# Patient Record
Sex: FEMALE | Race: WHITE | NOT HISPANIC OR LATINO | Employment: OTHER | URBAN - METROPOLITAN AREA
[De-identification: names, ages, dates, MRNs, and addresses within clinical notes are randomized per-mention and may not be internally consistent; named-entity substitution may affect disease eponyms.]

---

## 2019-06-26 ENCOUNTER — OFFICE VISIT CONVERTED (OUTPATIENT)
Dept: OTHER | Facility: HOSPITAL | Age: 64
End: 2019-06-26
Attending: NURSE PRACTITIONER

## 2020-02-14 ENCOUNTER — OFFICE VISIT CONVERTED (OUTPATIENT)
Dept: PULMONOLOGY | Facility: CLINIC | Age: 65
End: 2020-02-14
Attending: INTERNAL MEDICINE

## 2020-03-03 ENCOUNTER — HOSPITAL ENCOUNTER (OUTPATIENT)
Dept: CT IMAGING | Facility: HOSPITAL | Age: 65
Discharge: HOME | End: 2020-03-03
Attending: INTERNAL MEDICINE

## 2020-05-06 ENCOUNTER — OFFICE VISIT CONVERTED (OUTPATIENT)
Dept: PULMONOLOGY | Facility: CLINIC | Age: 65
End: 2020-05-06
Attending: INTERNAL MEDICINE

## 2020-06-11 ENCOUNTER — HOSPITAL ENCOUNTER (OUTPATIENT)
Dept: GASTROENTEROLOGY | Facility: HOSPITAL | Age: 65
Setting detail: HOSPITAL OUTPATIENT SURGERY
Discharge: HOME | End: 2020-06-11
Attending: INTERNAL MEDICINE

## 2020-06-12 LAB — SARS-COV-2 RNA SPEC QL NAA+PROBE: NOT DETECTED

## 2020-06-15 ENCOUNTER — HOSPITAL ENCOUNTER (OUTPATIENT)
Dept: GASTROENTEROLOGY | Facility: HOSPITAL | Age: 65
Setting detail: HOSPITAL OUTPATIENT SURGERY
Discharge: HOME | End: 2020-06-15
Attending: INTERNAL MEDICINE

## 2020-06-15 LAB
EPI CELLS NFR FLD: 2 %
LYMPHOCYTES NFR FLD MANUAL: 60 %
MACROPHAGE FLUID: 18 /100{WBCS}
NEUTROPHILS NFR FLD MANUAL: 20 %
VISUAL PRESENCE OF BLOOD: NORMAL

## 2020-06-18 LAB
BACTERIA SPEC AEROBE CULT: ABNORMAL
BACTERIA SPEC AEROBE CULT: ABNORMAL
CIPROFLOXACIN SUSC ISLT: <=0.5
CLINDAMYCIN SUSC ISLT: 0.25
CONV BRONCHIAL WASH CULTURE: ABNORMAL
DOXYCYCLINE SUSC ISLT: <=0.5
ERYTHROMYCIN SUSC ISLT: >=8
GENTAMICIN SUSC ISLT: <=0.5
LEVOFLOXACIN SUSC ISLT: 0.25
OXACILLIN SUSC ISLT: >=4
RIFAMPIN SUSC ISLT: <=0.5
TETRACYCLINE SUSC ISLT: <=1
TIGECYCLINE SUSC ISLT: <=0.12
TMP SMX SUSC ISLT: <=10
VANCOMYCIN SUSC ISLT: 1

## 2020-06-19 LAB
CONV ADENOVIRUS  (BAL OR WASH): NEGATIVE
FLUAV RNA SPEC QL NAA+PROBE: NEGATIVE
FLUBV RNA ISLT QL NAA+PROBE: NEGATIVE
HMPV RNA SPEC QL NAA+PROBE: NEGATIVE
HPIV1 RNA ISLT QL NAA+PROBE: NEGATIVE
HPIV2 SPEC QL CULT: NEGATIVE
HPIV3 SPEC QL CULT: NEGATIVE
RHINOVIRUS RNA SPEC QL NAA+PROBE: NEGATIVE
RSV A: NEGATIVE
RSV B RNA SPEC QL NAA+PROBE: NEGATIVE

## 2020-06-25 LAB — CONV LEGIONELLA CULTURE: NORMAL

## 2020-06-30 ENCOUNTER — OFFICE VISIT CONVERTED (OUTPATIENT)
Dept: PULMONOLOGY | Facility: CLINIC | Age: 65
End: 2020-06-30
Attending: INTERNAL MEDICINE

## 2020-09-30 ENCOUNTER — OFFICE VISIT CONVERTED (OUTPATIENT)
Dept: PULMONOLOGY | Facility: CLINIC | Age: 65
End: 2020-09-30
Attending: INTERNAL MEDICINE

## 2020-10-05 ENCOUNTER — HOSPITAL ENCOUNTER (OUTPATIENT)
Dept: CT IMAGING | Facility: HOSPITAL | Age: 65
Discharge: HOME | End: 2020-10-05
Attending: INTERNAL MEDICINE

## 2021-03-01 ENCOUNTER — HOSPITAL ENCOUNTER (OUTPATIENT)
Dept: CARDIOLOGY | Facility: HOSPITAL | Age: 66
Discharge: HOME | End: 2021-03-01
Attending: INTERNAL MEDICINE

## 2021-03-23 ENCOUNTER — OFFICE VISIT CONVERTED (OUTPATIENT)
Dept: PULMONOLOGY | Facility: CLINIC | Age: 66
End: 2021-03-23
Attending: NURSE PRACTITIONER

## 2021-05-15 VITALS
SYSTOLIC BLOOD PRESSURE: 110 MMHG | OXYGEN SATURATION: 97 % | WEIGHT: 193 LBS | TEMPERATURE: 98 F | RESPIRATION RATE: 18 BRPM | HEIGHT: 66 IN | DIASTOLIC BLOOD PRESSURE: 76 MMHG | HEART RATE: 80 BPM | BODY MASS INDEX: 31.02 KG/M2

## 2021-05-23 ENCOUNTER — TRANSCRIBE ORDERS (OUTPATIENT)
Dept: ADMINISTRATIVE | Facility: HOSPITAL | Age: 66
End: 2021-05-23

## 2021-05-23 DIAGNOSIS — J84.9 INTERSTITIAL PULMONARY DISEASE (HCC): Primary | ICD-10-CM

## 2021-05-24 ENCOUNTER — OFFICE VISIT CONVERTED (OUTPATIENT)
Dept: PULMONOLOGY | Facility: CLINIC | Age: 66
End: 2021-05-24
Attending: INTERNAL MEDICINE

## 2021-05-28 VITALS
OXYGEN SATURATION: 98 % | WEIGHT: 190 LBS | TEMPERATURE: 97.6 F | SYSTOLIC BLOOD PRESSURE: 139 MMHG | HEIGHT: 66 IN | HEART RATE: 62 BPM | RESPIRATION RATE: 10 BRPM | BODY MASS INDEX: 31.5 KG/M2 | OXYGEN SATURATION: 96 % | WEIGHT: 196 LBS | DIASTOLIC BLOOD PRESSURE: 76 MMHG | HEART RATE: 80 BPM | TEMPERATURE: 98 F | DIASTOLIC BLOOD PRESSURE: 72 MMHG | HEIGHT: 66 IN | BODY MASS INDEX: 30.53 KG/M2 | RESPIRATION RATE: 15 BRPM | SYSTOLIC BLOOD PRESSURE: 133 MMHG

## 2021-05-28 VITALS
HEART RATE: 74 BPM | DIASTOLIC BLOOD PRESSURE: 68 MMHG | BODY MASS INDEX: 32.14 KG/M2 | RESPIRATION RATE: 16 BRPM | OXYGEN SATURATION: 99 % | TEMPERATURE: 97.5 F | HEIGHT: 66 IN | SYSTOLIC BLOOD PRESSURE: 129 MMHG | WEIGHT: 200 LBS

## 2021-05-28 VITALS
RESPIRATION RATE: 14 BRPM | WEIGHT: 192 LBS | OXYGEN SATURATION: 98 % | RESPIRATION RATE: 18 BRPM | HEIGHT: 66 IN | DIASTOLIC BLOOD PRESSURE: 95 MMHG | WEIGHT: 191 LBS | BODY MASS INDEX: 30.86 KG/M2 | HEART RATE: 65 BPM | TEMPERATURE: 96.8 F | BODY MASS INDEX: 30.7 KG/M2 | DIASTOLIC BLOOD PRESSURE: 73 MMHG | HEART RATE: 76 BPM | TEMPERATURE: 98 F | HEIGHT: 66 IN | SYSTOLIC BLOOD PRESSURE: 140 MMHG | SYSTOLIC BLOOD PRESSURE: 121 MMHG | OXYGEN SATURATION: 97 %

## 2021-05-28 NOTE — PROGRESS NOTES
Patient: WES DUBON     Acct: [id number]     Report: #[id number]  UNIT #: [id number]     : 1955    Encounter Date:2020  PRIMARY CARE: HARDEEP POE DO  ***Signed***  --------------------------------------------------------------------------------------------------------------------  Chief Complaint      Encounter Date      2020            Primary Care Provider      HARDEEP POE DO            Referring Provider      VIRGIL GENAO [facility]            Patient Complaint      Patient is complaining of      abnormal chest xray            VITALS      Height 5 ft 6 in / 167.64 cm      Weight 196 lbs 0 oz / 88.52902 kg      BSA 1.98 m2      BMI 31.6 kg/m2      Temperature 98.0 F / 36.67 C - Oral      Pulse 62      Respirations 10      Blood Pressure 133/76 Sitting, Right Arm      Pulse Oximetry 98%, roomair      Initial Exhaled Nitrous Oxide      Date:  2020      Exhaled Nitrous Oxide Results:  18            HPI      The patient is a very pleasant 64 year old obese  female never smoker     here for abnormal CT scan of the chest. She reports a history of acid reflux,     fairly well controlled with PPI. She also reports a chronic cough for years. It     is mostly dry and at night with no sputum production or hemoptysis. She has     occasional wheezing with her cough. She notes that when she cuts her dose of PPI    down her acid reflux is worse and her cough gets worse. She denies any dyspnea,     fever or chills, headaches and hemoptysis. She went to the ER with chest pain in    November at her nearby hospital and had a CT scan of the chest with contrast     done showing bibasilar peripheral opacities and ground glass opacities     concerning for possible interstitial lung disease. There was also septal     thickening and mild bibasilar fibrosis noted. She endorses some dry eyes and dry    skin along with pain in hands, knees and wrists. Of note, she notes both her     pinky  fingers are slightly deviated out. She denies any red or swollen joints.     She denies any history of lupus, rheumatoid arthritis or Sjogren's disease. She     denies any ulcers of hands, feet, unusual rashes, lumps or bumps.  She denies     any blurry vision. She lives in [place] and had exposure to mold.  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 Of note, she states she     has inhaled the mold a few times and had worsening respiratory symptoms. She is     not on any inhalers or had pulmonary function test. She has never been told she     had interstitial lung disease before. She denies any leg swelling, orthopnea or     paroxysmal nocturnal dyspnea, chest pain or hemoptysis. She is able to perform     her activities of daily living without difficulty and denies any swollen glands     in her lymph nodes, head or neck.              I personally reviewed Review of Systems, family, social, surgical and medical     history and agree with their findings.            ROS      Constitutional:  Denies: Fatigue, Fever, Weight gain, Weight loss, Chills,     Insomnia, Other      Respiratory/Breathing:  Complains of: Wheezing, Cough; Denies: Shortness of air,    Hemoptysis, Pleuritic pain, Other      Endocrine:  Denies: Polydipsia, Polyuria, Heat/cold intolerance, Abnorml     menstrual pattern, Diabetes, Other      Eyes:  Denies: Blurred vision, Vision Changes, Other      Ears, nose, mouth, throat:  Denies: Mouth lesions, Thrush, Throat pain,     Hoarseness, Allergies/Hay Fever, Post Nasal Drip, Headaches, Recent Head Injury,    Nose Bleeding, Neck Stiffness, Thyroid Mass, Hearing Loss, Ear Fullness, Dry     Mouth, Nasal or Sinus Pain, Dry Lips, Nasal discharge, Nasal congestion, Other      Cardiovascular:  Denies: Palpitations, Syncope, Claudication, Chest Pain, Wake     up Gasping for air, Leg Swelling, Irregular Heart  Rate, Cyanosis, Dyspnea on     Exertion, Other      Gastrointestinal:  Denies: Nausea, Constipation, Diarrhea, Abdominal pain,     Vomiting, Difficulty Swallowing, Reflux/Heartburn, Dysphagia, Jaundice,     Bloating, Melena, Bloody stools, Other      Genitourinary:  Denies: Urinary frequency, Incontinence, Hematuria, Urgency,     Nocturia, Dysuria, Testicular problems, Other      Musculoskeletal:  Denies: Joint Pain, Joint Stiffness, Joint Swelling, Myalgias,    Other      Hematologic/lymphatic:  DENIES: Lymphadenopathy, Bruising, Bleeding tendencies,     Other      Neurological:  Denies: Headache, Numbness, Weakness, Seizures, Other      Psychiatric:  Denies: Anxiety, Appropriate Effect, Depression, Other      Sleep:  No: Excessive daytime sleep, Morning Headache?, Snoring, Insomnia?, Stop    breathing at sleep?, Other      Integumentary:  Denies: Rash, Dry skin, Skin Warm to Touch, Other      Immunologic/Allergic:  Denies: Latex allergy, Seasonal allergies, Asthma,     Urticaria, Eczema, Other      Immunization status:  No: Up to date            FAMILY/SOCIAL/MEDICAL HX      Surgical History:  Yes: Cholecystectomy      Stroke - Family Hx:  Grandparent      Heart - Family Hx:  Mother      Diabetes - Family Hx:  Brother      Cancer/Type - Family Hx:  Father (lung), Grandparent (breast), Uncle      Is Father Still Living?:  No      Is Mother Still Living?:  Yes       Family History:  Yes      Social History:  No Tobacco Use, No Alcohol Use, No Recreational Drug use      Smoking status:  Never smoker      Tubal Ligation:  Yes      Hysterectomy:  Yes      Anticoagulation Therapy:  No      Antibiotic Prophylaxis:  No      Medical History:  Yes: Mitral Valve Prolapse      Psychiatric History      none            PREVENTION      Hx Influenza Vaccination:  Yes      Date Influenza Vaccine Given:  Dec 1, 2019      2 or More Falls Past Year?:  No      Fall Past Year with Injury?:  No      Hx Pneumococcal Vaccination:  No       Encouraged to follow-up with:  PCP regarding preventative exams.      Chart initiated by      catarina manuel/ ma            ALLERGIES/MEDICATIONS      Allergies:        Coded Allergies:             Macrobid (Verified  Allergy, 2/14/20)           Sulfa (Sulfonamide Antibiotics) (Verified  Allergy, 2/14/20)      Medications    Last Reconciled on 2/14/20 08:52 by LISA ORTIZ MD      Dicyclomine HCl (BENTYL) 20 Mg Tablet      20 MG PO QIDAC, #120 TAB         Reported         2/14/20       Estradiol (Estrace) 1 Mg Tablet      1 MG PO QDAY for 30 Days, #30 TAB         Reported         2/14/20       Cyclobenzaprine Hcl (Cyclobenzaprine*) 10 Mg Tablet      10 MG PO TID PRN for MUSCLE SPASMS, TAB 0 Refills         Reported         2/14/20       Atenolol (ATENOLOL) 25 Mg Tablet      25 MG PO QDAY, #30 TAB 0 Refills         Reported         2/14/20       Rosuvastatin Calcium (Crestor*) 10 Mg Tablet      10 MG PO HS, #30 TAB 0 Refills         Reported         2/14/20       Montelukast Sodium (Singulair*) 10 Mg Tablet      10 MG PO QDAY, #30 TAB 0 Refills         Reported         2/14/20       Pantoprazole (Protonix) Unknown Strength Tablet.      PO HS, #30 TAB 0 Refills         Reported         2/14/20       Levocetirizine (Levocetirizine) 5 Mg Tablet      5 MG PO QDAY, TAB         Reported         2/14/20      Current Medications      Current Medications Reviewed 2/14/20            EXAM      Vital Signs Reviewed      Gen: WDWN, Alert, NAD.        HEENT:  PERRL, EOMI.  OP, nares clear, no sinus tenderness.      Neck:  Supple, no JVD, no thyromegaly.      Lymph: No axillary, cervical, supraclavicular lymphadenopathy noted bilaterally.      Chest:  Good aeration, coarse inspiratory crackles at bases left greater than     right, tympanic to percussion bilaterally, no work of breathing noted.      CV:  RRR, no MGR, pulses 2+, equal.      Abd:  Soft, NT, ND, + BS, no HSM. Obese.        EXT:  No clubbing, no cyanosis, no  edema, no joint tenderness.       Neuro:  A  Skin: No rashes or lesions.      Vtials      Vitals:             Height 5 ft 6 in / 167.64 cm           Weight 196 lbs 0 oz / 88.92217 kg           BSA 1.98 m2           BMI 31.6 kg/m2           Temperature 98.0 F / 36.67 C - Oral           Pulse 62           Respirations 10           Blood Pressure 133/76 Sitting, Right Arm           Pulse Oximetry 98%, roomair            REVIEW      Results Reviewed      PCCS Results Reviewed?:  Yes Prev Lab Results, Yes Prev Radiology Results, Yes     Previous [facility] Records      Lab Results      I personally reviewed office notes from referring provider. Labs show no     peripheral eosinophilia and no evidence of chronic hypoxic respiratory failure.      Radiographic Results      I personally reviewed chest x-ray that showed some potential bibasilar     atelectasis. I personally reviewed CT scan of the chest with contrast showing no    pulmonary embolism with some mild bibasilar fibrosis with ground glass opacities    and septal thickening concerning for possible interstitial lung disease versus     pneumonitis.            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      * MONTELUKAST SODIUM (Singulair*) 10 MG TABLET: 10 MG PO QDAY #30      * Rosuvastatin Calcium (Crestor*) 10 MG TABLET: 10 MG PO HS #30      * ATENOLOL 25 MG TABLET: 25 MG PO QDAY #30      * CYCLOBENZAPRINE HCL (Cyclobenzaprine*) 10 MG TABLET: 10 MG PO TID PRN MUSCLE       SPASMS      * Estradiol (Estrace) 1 MG TABLET: 1 MG PO QDAY 30 Days #30      * Dicyclomine HCl (BENTYL) 20 MG TABLET: 20  MG PO [facility] #120      New Diagnostics      * PFT-Comp, PrePost,DLCO,BodyBox, Week         Dx: Chronic cough - R05      * 6 Min Walk w O2 Titration Test, Routine         Dx: Chronic cough - R05      * Chest W/O Cont High Resolution, As Soon As Possible         Dx: Chronic cough - R05      * CBC, Month         Dx: Chronic cough - R05      * Sed Rate, Routine         Dx: Chronic cough - R05      * RA Profile, Routine         Dx: Chronic cough - R05      * BHAVANA Screen/Reflex Hep Titer, Month         Dx: Chronic cough - R05      * Comp Metabolic Panel, Month         Dx: Chronic cough - R05      * Immunoglobulin  E (I, Week         Dx: Chronic cough - R05      * Scl70 Antibody, Week         Dx: Chronic cough - R05      * Centromere Antibody , Week         Dx: Chronic cough - R05      * Sjogrens Ssa Antibod, Week         Dx: Chronic cough - R05      * Sjogrens Ssb Antibod, Week         Dx: Chronic cough - R05      * Ribonucleoprotein particle, Week         Dx: Chronic cough - R05      * Rast Aspergillus Fum IGE, Routine         Dx: Chronic cough - R05      * Upper Resp [place] Imm Prof, Month         Dx: Chronic cough - R05      * Immuno A (Iga), Routine         Dx: Chronic cough - R05      * Histoplasma Antigen Urine, Routine         Dx: Chronic cough - R05      * Immuno G (Igg), Routine         Dx: Chronic cough - R05      * IgG SUBCLASSES 1-4  IGGSC, Routine         Dx: Chronic cough - R05      * Immuno M (Igm), Routine         Dx: Chronic cough - R05      * Hypersensitivity Pneumon Panel, As Soon As Possible         Dx: Chronic cough - R05      * 1 3 BETA D GLUCAN FUNGIT BDGLU, Routine         Dx: Chronic cough - R05      * CRP HIGH SENSITIVE, Routine         Dx: Chronic cough - R05      * Hiv 1 By Eia W/West , Month         Dx: Chronic cough - R05      * ASPERGILLUS AB QN DID APGAQ, Routine         Dx: Chronic cough - R05      * Aspergillus Galactom, Routine         Dx: Chronic cough - R05      * Myositis Panel,  Routine         Dx: Chronic cough - R05      * Anticytoplas. Neutro ANCA, Routine         Dx: Chronic cough - R05      * Cyclic Citrull Peptide, Routine         Dx: Chronic cough - R05      * Fungal Antibodies Cs, Routine         Dx: Chronic cough - R05      * Fungal Serology Prof, Routine         Dx: Chronic cough - R05      IMPRESSION:      1. Chronic cough.      2. Chronic wheezing.       3. Abnormal CT scan of the chest. Differential diagnoses include interstitial     lung disease, congestive heart failure or infectious etiology.       4. Never smoker.       5. History of mold exposure.       6. Tobacco abuse of cigarettes in remission.      7. Gastroesophageal reflux disease without esophagitis currently well     controlled.       8. Obesity with BMI 31.6.            PLAN:      1.  I performed exhaled nitric oxide testing in the office today and her level     is 18 indicative of no eosinophilic airway inflammation.      2. I think her chronic cough is mostly related to her gastroesophageal reflux     disease however I do have some concerns given her CT scan of the chest. She     could have interstitial lung disease from a multitude of etiologies. This is a     very unusual pattern for idiopathic pulmonary fibrosis and there is no evidence     of any idiopathic pulmonary fibrosis pattern at this time. We would have some co    ncern of pneumonitis from an infectious etiology versus hypersensitivity given     her mold exposures. We would also consider unusual infection. She is not on any     medications that would cause drug inducted pneumonitis. With her severe acid     reflux, she could possibly be aspirating.       3. We will check CT scan of the chest without contrast with high resolution to     better appreciate possible interstitial lung disease.       4. Check full pulmonary function test and 6 minute walk test.       5. Do serological work up for interstitial lung disease including CBC, CMP, ESR,    CRP,  HIV, rheumatoid arthritis, BHAVANA, ANCA, immunoglobulins, IgG subclasses,     Sjogren's disease, anti-centromere antibodies, anti-RNP, SCL-70, beta D glucan,     galactomannan, RAST testing for aspergillus, fungal serologies, anti-CCP and     myositis panel.       6. Up to date with flu vaccine, no indication for Prevnar or Pneumovax.       7.  I spent 5 minutes discussing diet and exercise counseling. I recommend 30     minutes of daily exercise as well as 1800 calorie low fat diet.        8. Continue PPI.      9. Follow up to discuss test results after they are complete.            Patient Education      ACO BMI High above 25:  Counseling Given, Encouraged weight loss, Encourage     dietary changes            Electronically signed by LISA ORTIZ  02/17/2020 14:05       Disclaimer: Converted document may not contain table formatting or lab diagrams. Please see [employer] for the authenticated document.

## 2021-05-28 NOTE — PROGRESS NOTES
Patient: RACH DUBON     Acct: [id number]     Report: #[id number]  UNIT #: [id number]     : 1955    Encounter Date:2020  PRIMARY CARE: HARDEEP POE DO  ***Signed***  --------------------------------------------------------------------------------------------------------------------  TELEHEALTH NOTE      History of Present Illness      Chief Complaint: f/u chest ct scan results            Rach Dubon is presenting for evaluation via Telehealth visit. Verbal consent    obtained before beginning visit.            Provider spent 25 minutes with the patient during telehealth visit including     discussing the case with the patient over the phone and personally reviewing all    pertinent labs, imaging and provider notes.            The following staff were present during the visit: yesenia/ sharon holley md            The patient is a 64 year old  female with chronic cough  who presents     for Telehealth visit today. Since her last office visit I sent her for high     resolution CT scan of the chest which did show pulmonary fibrosis with some     evidence of pneumonitis in a subpleural and basilar prominent fashion. No     honeycombing was noted. She also has a left lower lobe pulmonary nodule that is     about 0.5 cm in size and needs follow up.  Connective tissue disease serologies,    hypersensitivity work up, CBC and inflammatory markers were unremarkable other     than an elevated CRP. She reports horrible acid reflux despite taking Protonix o    nce a day. The patient states her respiratory symptoms are unchanged. She has     mild dyspnea on exertion but for the most her cough is the biggest issue. She     reports chronic cough that is dry throughout the day. She denies any hemoptysis     or chest pain. She denies any nausea or vomiting, fever or chills, headaches.     She does have exposure to mold, hay and [place]  [place]ly exposed [place] this. She is able to perform her ADL's without     difficulty and denies any swollen glands in her lymph nodes, head or neck.            I personally reviewed Review of Systems, family, social, surgical and medical     history and agree with their findings.            Physical exam is deferred due to Telehealth visit.            I personally reviewed CBC, CMP, connective tissue disease serologies all of     which were unremarkable. Hypersensitivity pneumonitis and RAST testing were     unremarkable. Immunoglobulins were unremarkable. CRP was mildly elevated at 4.                         Past Med History               [facility]                PACS RADIOLOGY REPORT            Patient: WES DUBON   Acct: #[id number]   Report: #[id number]            UNIT #: [id number]    DOS: 20 0821      INSURANCE:[facility] [facility]WORK - [facility]   ORDER #:[id number]      LOCATION:CT     : 1955            PROVIDERS      ADMITTING:     ATTENDING: LISA ORTIZ      FAMILY:  HARDEEP POE DO   ORDERING:  LISA ORTIZ         OTHER:    DICTATING:  Heraclio Smith MD            REQ #:[id number]   [facility]      REASON FOR EXAM:  COUGH      REASON FOR VISIT:  COUGH            *******Signed******         PROCEDURE:   CT CHEST WITHOUT CONTRAST             COMPARISON:   None.             INDICATIONS:   COUGH             TECHNIQUE:   CT images were created without the administration of contrast     material.               PROTOCOL:     Standard imaging protocol performed                RADIATION:     DLP: 656mGy*cm          Automated exposure control was utilized to minimize radiation dose.              FINDINGS:         There are interstitial changes involving the lungs that could relate to     fibrosis.  There is no       consolidation or definite pleural effusions.  There is a noncalcified pulmonary     nodule in  the left       lower lobe seen on axial image 44 measuring 0.49 cm.  There is some nodularity     along the minor       fissure better defined on the sagittal images that could relate to     intrapulmonary lymph node.        There are no pleural effusions.             There is some calcification in the left hilar area compatible with prior     granulomatous exposure.             Lower slices through the upper abdomen reveal calcification around an ovoid area    of the splenic       artery that measures 1.05 cm and might relate to a small aneurysm.               CONCLUSION:         1. Interstitial changes involving both lungs that could relate to fibrosis.      2. Noncalcified pulmonary nodule left lower lobe.  There also is a nodular area     along the minor       fissure that may relate to intrapulmonary lymph node.  This also could reflect     some scarring.  The       findings include a single, incidentally detected, solid pulmonary nodule,     measuring less than 6mm.        2017 guidelines from the Fleischner Society for the follow-up and management of     incidentally       detected indeterminate pulmonary nodules in persons at least 35 years of age     depend on nodule size       (average length and width) and underlying risk factors (including smoking and     other risk factors).       Please consider the following recommendations after clinical assessment of risk     factors.  For <6mm       solid nodules: In low risk patients, no follow-up required.  If suspicious     morphology or upper lobe       location, consider 12 month follow-up.  In high risk patients, optional CT in 12    months.        3. Probable small splenic artery aneurysm.              JOSSELYN BETHEA MD             Electronically Signed and Approved By: JOSSELYN BETHEA MD on 3/03/2020 at 9:26                        Until signed, this is an unconfirmed preliminary report that may contain      errors and is subject to change.                                               KAMCR:      D:20 0926            Patient: RACH DUBON   Acct: #[id number]   Report: #[id number]            UNIT #: [id number]    DOS:       LOCATION:CT     : 1955            PROVIDERS      ADMITTING:     FAMILY:  HARDEEP POE, DO         OTHER:       DICTATING:  LISA ORTIZ MD            REASON FOR VISIT:  COUGH            *******Signed******                                    [facility]                            [place], [place]  [address]               __________________________________________________________________________             Patient Name:                   Attending Physician:      Rach Dubon M.D.             Patient Visit # MR #            Admit Date  Disch Date     Location      [id number]    [id number]      2020                 CT- -             Date of Birth      1955      __________________________________________________________________________      821 - DIAGNOSTIC REPORT             PULMONARY FUNCTION TEST             DATE OF TEST:      3/3/2020.             SPIROMETRY:      No obstructive lung defect is noted.      No bronchodilator response is noted.             FLOW VOLUME LOOP:      Flow volume loop appears normal.             LUNG VOLUMES:      There is no evidence of restriction, hyperinflation or air trapping.             DIFFUSION CAPACITY:      Diffusion capacity mildly reduced at 58% of predicted.             OVERALL IMPRESSION:      1. No evidence of obstruction or restriction.      2. No bronchodilator response.      3. Diffusion capacity within normal limits.             To be electronically signed in [employer]      [id number] LISA ORTIZ M.D.             AM:sonia      D:  2020 16:32      T:  2020 17:11      #[id number]             Until signed, this is an unconfirmed preliminary report that  "may contain      errors and is subject to change.                   Until signed, this is an unconfirmed preliminary report that may contain      errors and is subject to change.                     <Electronically signed by LISA ORTIZ MD>                03/06/20 1545               LISA ORTIZ MD:JTJ      D:03/05/20 1632      Overview of Symptoms      pt states \" I do some coughing and wheezing no soa\"            Allergies/Medications      Allergies:        Coded Allergies:             NITROFURANTOIN (Verified  Allergy, Unknown, 5/6/20)           SULFA (SULFONAMIDE ANTIBIOTICS) (Verified  Allergy, Unknown, 5/6/20)      Medications    Last Reconciled on 5/6/20 11:34 by LISA ORTIZ MD      raNITIdine HCL (raNITIdine HCL) 150 Mg Tablet      150 MG PO HS for 30 Days, #30 TAB 3 Refills         Prov: LISA ORTIZ         5/6/20       Beclomethasone Dipropionate (Qvar 40 Redihaler 10.6 GM) 10.6 Gm Hfa.aeroba      1 PUFF INH RTBID, #1 INH 3 Refills         Prov: LISA ORTIZ         5/6/20       Dicyclomine HCl (BENTYL) 20 Mg Tablet      20 MG PO QIDAC, #120 TAB         Reported         2/14/20       Estradiol (Estrace) 1 Mg Tablet      1 MG PO QDAY for 30 Days, #30 TAB         Reported         2/14/20       Cyclobenzaprine Hcl (Cyclobenzaprine*) 10 Mg Tablet      10 MG PO TID PRN for MUSCLE SPASMS, TAB 0 Refills         Reported         2/14/20       Atenolol (ATENOLOL) 25 Mg Tablet      25 MG PO QDAY, #30 TAB 0 Refills         Reported         2/14/20       Rosuvastatin Calcium (Crestor*) 10 Mg Tablet      10 MG PO HS, #30 TAB 0 Refills         Reported         2/14/20       Montelukast Sodium (Singulair*) 10 Mg Tablet      10 MG PO QDAY, #30 TAB 0 Refills         Reported         2/14/20       Pantoprazole (Protonix) Unknown Strength Tablet.      PO HS, #30 TAB 0 Refills         Reported         2/14/20       Levocetirizine " (Levocetirizine) 5 Mg Tablet      5 MG PO QDAY, TAB         Reported         2/14/20            Plan/Instructions      Ambulatory Assessment/Plan:        Notes      New Medications      * Beclomethasone Dipropionate (Qvar 40 Redihaler 10.6 GM) 10.6 GM HFA.AEROBA: 1       PUFF INH RTBID #1      * raNITIdine  MG TABLET: 150 MG PO HS 30 Days #30      Plan/Instructions      * Plan Of Care: ()            * Chronic conditions reviewed and taken into consideration for today's treatment       plan.      * Patient instructed to seek medical attention urgently for new or worsening       symptoms.      * Patient was educated/instructed on their diagnosis, treatment and medications       prior to discharge from the clinic today.            IMPRESSION:      1. Chronic cough.      2. Gastroesophageal reflux disease without esophagitis poorly controlled.       3. Interstitial lung disease. Patient with bibasilar pulmonary fibrosis of     unclear etiology. It would be unusual for her to have idiopathic pulmonary fibro    sis given her never smoking history, gender and age. [place] it could still be hypersensitivity pneumonitis despite negative     serological work up.  I would also consider an exposure in the past. I am     unclear if this is progressive or stable at this time and I cannot recommend     anti-fibrotics.       4. Never smoker.       5. Tobacco abuse of cigarettes in remission.             PLAN:      1. Work up for interstitial lung disease so far is unremarkable. Differential     diagnoses remains history of exposure in the past, mold exposure, dust or hay     exposure.       2. Given her cough and elevated FENO on previous visit with no obvious work up     for pulmonary fibrosis, we will start qvar 1 puff twice daily. Inhaler education     provided today.      3. We will take the patient for bronchoscopy with brushings, biopsies and     bronchalveolar lavage. Risks and benefits  were discussed with the patient and     she is willing to undergo the procedure. We will do COVID-19 testing prior to     bronchoscopy per our new protocol.       4. Start ranitidine 150 mg at night and continue protonix 40 mg during the day.       5. Up to date with  flu, Prevnar and Pneumovax.       6. Follow up in 2 weeks after bronchoscopy. If there is an etiology we can     treat, we will treat with immunsuppression otherwise we might end up doing     annual pulmonary function test and 6 minute walk test and only consider anti-    fibrotic therapy if she has a decline or progressively worsening fibrosis over     time. The patient verbalized understanding. We will have the patient follow up      2 weeks after bronchoscopy.             CONSENT:      Bronchoscopy with brushings, biopsies and bronchalveolar lavage.             I have discussed the risks of the procedure with the patient including     pneumothorax, hemothorax, bleeding, hypoxia, required mechanical ventilation and     death.  The patient recognizes these findings, acknowledges these findings and     is agreeable to the procedure.      Codes:  Phone Eval 21-30 mi [id number]            Electronically signed by LISA ORTIZ  05/07/2020 12:16       Disclaimer: Converted document may not contain table formatting or lab diagrams. Please see [employer] for the authenticated document.

## 2021-05-28 NOTE — PROGRESS NOTES
Patient: WES DUBON     Acct: [id number]     Report: #[id number]  UNIT #: [id number]     : 1955    Encounter Date:2020  PRIMARY CARE: HARDEEP POE DO  ***Signed***  --------------------------------------------------------------------------------------------------------------------  Chief Complaint      Encounter Date      2020            Primary Care Provider      HARDEEP POE DO            Referring Provider      VIRGIL GENAO [facility]            Patient Complaint      Patient is complaining of      Patient here today for F/U, Post Bronch            VITALS      Height 66 in / 167.64 cm      Weight 190 lbs  / 86.782820 kg      BSA 1.96 m2      BMI 30.7 kg/m2      Temperature 97.6 F / 36.44 C - Temporal      Pulse 80      Respirations 15      Blood Pressure 139/72 Sitting, Right Arm      Pulse Oximetry 96%, room air      Initial Exhaled Nitrous Oxide      Date:  2020      Exhaled Nitrous Oxide Results:  18            HPI      The patient is a 65 year old  female here for interstitial lung disease    follow up. The patient had a bronchoscopy done and had a post-iatrogenic right     sided pneumothorax. The patient was hospitalized with a chest tube. She was     discharged twelve days ago. She is here today. Bronchoscopy grew MRSA and she is    to finish a 14 day course of doxycycline.  Bronchoalveolar lavage is lymphocyte     predominant and patient's imaging, presentation and constellation of findings     are consistent with chronic hypersensitivity pneumonitis. She is on prednisone     30 mg a day with a slow steroid taper.  She is here today and states this is the    best she has felt in years. She denies any dyspnea, cough, wheeze, headaches,     chest pain or hemoptysis. Denies any nausea, vomiting, fevers, chills, headaches    or chest pain. [place], hay and     dust, but cannot identify the obvious exposure causing  this hypersensitivity     pneumonitis. She is able to perform her ADLs without difficulty.  Denies any     swollen glands or lymph nodes of the head and neck.            I have personally reviewed the review of systems, past family, social, surgical     and medical histories and I agree with the findings.            ROS      Constitutional:  Denies: Fatigue, Fever, Weight gain, Weight loss, Chills,     Insomnia, Other      Respiratory/Breathing:  Complains of: Cough; Denies: Shortness of air, Wheezing,    Hemoptysis, Pleuritic pain, Other      Endocrine:  Denies: Polydipsia, Polyuria, Heat/cold intolerance, Abnorml     menstrual pattern, Diabetes, Other      Eyes:  Denies: Blurred vision, Vision Changes, Other      Ears, nose, mouth, throat:  Denies: Mouth lesions, Thrush, Throat pain, Hoarsen    ess, Allergies/Hay Fever, Post Nasal Drip, Headaches, Recent Head Injury, Nose     Bleeding, Neck Stiffness, Thyroid Mass, Hearing Loss, Ear Fullness, Dry Mouth,     Nasal or Sinus Pain, Dry Lips, Nasal discharge, Nasal congestion, Other      Cardiovascular:  Denies: Palpitations, Syncope, Claudication, Chest Pain, Wake     up Gasping for air, Leg Swelling, Irregular Heart Rate, Cyanosis, Dyspnea on     Exertion, Other      Gastrointestinal:  Denies: Nausea, Constipation, Diarrhea, Abdominal pain,     Vomiting, Difficulty Swallowing, Reflux/Heartburn, Dysphagia, Jaundice,     Bloating, Melena, Bloody stools, Other      Genitourinary:  Denies: Urinary frequency, Incontinence, Hematuria, Urgency,     Nocturia, Dysuria, Testicular problems, Other      Musculoskeletal:  Denies: Joint Pain, Joint Stiffness, Joint Swelling, Myalgias,    Other      Hematologic/lymphatic:  DENIES: Lymphadenopathy, Bruising, Bleeding tendencies,     Other      Neurological:  Denies: Headache, Numbness, Weakness, Seizures, Other      Psychiatric:  Denies: Anxiety, Appropriate Effect, Depression, Other      Sleep:  No: Excessive daytime sleep, Morning  Headache?, Snoring, Insomnia?, Stop    breathing at sleep?, Other      Integumentary:  Denies: Rash, Dry skin, Skin Warm to Touch, Other      Immunologic/Allergic:  Denies: Latex allergy, Seasonal allergies, Asthma,     Urticaria, Eczema, Other      Immunization status:  No: Up to date            FAMILY/SOCIAL/MEDICAL HX      Surgical History:  Yes: Cholecystectomy (LAP)      Stroke - Family Hx:  Grandparent      Heart - Family Hx:  Mother      Diabetes - Family Hx:  Brother      Cancer/Type - Family Hx:  Father (lung), Grandparent (breast), Uncle      Is Father Still Living?:  No      Is Mother Still Living?:  Yes       Family History:  Yes      Social History:  No Tobacco Use, No Alcohol Use, No Recreational Drug use      Smoking status:  Never smoker      Tubal Ligation:  Yes      Hysterectomy:  Yes      Anticoagulation Therapy:  No      Antibiotic Prophylaxis:  No      Medical History:  Yes: Arthritis, Asthma, Hemorrhoids/Rectal Prob (REFLUX,     HIATAL HERNIA), Mitral Valve Prolapse, Shortness Of Breath (COUGH, PULMONARY     FIBROSIS); No: Blood Disease, Chemotherapy/Cancer, Chronic Bronchitis/COPD,     Congestive Heart Failu, Deafness or Ringing Ears, Heart Attack, Sinus Trouble,     Miscellaneous Medical/oth            PREVENTION      Hx Influenza Vaccination:  Yes      Date Influenza Vaccine Given:  Dec 1, 2019      2 or More Falls Past Year?:  No      Fall Past Year with Injury?:  No      Hx Pneumococcal Vaccination:  No      Encouraged to follow-up with:  PCP regarding preventative exams.      Chart initiated by      Keyonna Simpson CMA            ALLERGIES/MEDICATIONS      Allergies:        Coded Allergies:             NITROFURANTOIN (Verified  Allergy, Severe, RASH, HIVES, 6/10/20)           SULFA (SULFONAMIDE ANTIBIOTICS) (Verified  Allergy, Severe, RASH, HIVES,     6/10/20)      Medications    Last Reconciled on 6/30/20 16:52 by LISA ORTIZ MD      predniSONE (Deltasone) 10 Mg Tablet      30 MG PO  QDAY for 30 Days, #180 TAB 0 Refills         Prov: LISA ORTIZ         6/30/20       Doxycycline Hyclate (Doxycycline Hyclate*) 100 Mg Capsule      100 MG PO Q12HR for 14 Days, #28 CAP         Prov: Lorri Saldana         6/18/20       Perez-Fluticasone (Fluticasone 50 mcg) 16 Gm Spray.susp      2 PUFFS NARE EACH QDAY, #1 BOTTLE 0 Refills         Reported         6/16/20       (mucinex PE)   No Conflict Check      600 MG PO BID PRN for CONGESTION         Reported         6/16/20       Ibuprofen (Ibuprofen) 200 Mg Tab      400 MG PO Q8H PRN for PAIN/CRAMPS, #100 TAB 0 Refills         Reported         6/16/20       Pantoprazole (Protonix) 20 Mg Tablet.dr      40 MG PO QDAY, TAB         Reported         6/16/20       Dicyclomine HCl (BENTYL) 20 Mg Tablet      20 MG PO TID PRN for ABDOMINAL CRAMPING, #120 TAB         Reported         2/14/20       Cyclobenzaprine Hcl (Cyclobenzaprine*) 10 Mg Tablet      10 MG PO TID PRN for MUSCLE SPASMS, TAB 0 Refills         Reported         2/14/20       Atenolol (ATENOLOL) 25 Mg Tablet      25 MG PO HS, #30 TAB 0 Refills         Reported         2/14/20       Rosuvastatin Calcium (Crestor*) 10 Mg Tablet      10 MG PO HS, #30 TAB 0 Refills         Reported         2/14/20       Montelukast Sodium (Singulair*) 10 Mg Tablet      10 MG PO HS, #30 TAB 0 Refills         Reported         2/14/20       Levocetirizine (Levocetirizine) 5 Mg Tablet      5 MG PO QDAY, TAB         Reported         2/14/20      Current Medications      Current Medications Reviewed 6/30/20            EXAM      Vital Signs Reviewed.      General:  WDWN, Alert, NAD.      HEENT: PERRL, EOMI.  OP, nares clear, no sinus tenderness.      Neck: Supple, no JVD, no thyromegaly.      Lymph: No axillary, cervical, supraclavicular lymphadenopathy noted bilaterally.      Chest: Good aeration, clear to auscultation bilaterally, tympanic to percussion     bilaterally, no work of breathing noted.      CV: RRR, no MGR, pulses 2+,  equal.        Abd: Soft, NT, ND, +BS, no HSM.      EXT: No clubbing, no cyanosis, no edema, no joint tenderness.        Neuro:  A  Skin: No rashes or lesions.      Vtials      Vitals:             Height 66 in / 167.64 cm           Weight 190 lbs  / 86.828573 kg           BSA 1.96 m2           BMI 30.7 kg/m2           Temperature 97.6 F / 36.44 C - Temporal           Pulse 80           Respirations 15           Blood Pressure 139/72 Sitting, Right Arm           Pulse Oximetry 96%, room air            REVIEW      Results Reviewed      PCCS Results Reviewed?:  Yes Prev Lab Results, Yes Prev Radiology Results, Yes     Previous [facility] Records      Lab Results      I reviewed my last office note. I reviewed my bronchoscopy note.  Bronchoscopy     transbronchial lung biopsies reviewed showing alveolar inflammation.     Bronchoalveolar lavage was lymphocyte predominant.  Washings and bronchoalveolar    lavage cultures grew MRSA. I reviewed the patient's last chest CT  personally     from 2020.  I also personally reviewed multiple chest x-rays from 2020.  I    also reviewed the patient's hospital records, discharge summary, my consult     notes and follow up notes as well.              Patient: WES DUBON   Acct #: [id number]         : 1955   Report #: [id number]      MR #: [id number]   Location: [address] [place] [address]                              ***Signed***      Procedure Note      Chest tube procedure note            Indication: Enlarging right pneumothorax            Consent: From            Timeout: Performed            The patient was prepped and draped in a sterile fashion over the right fourth     intercostal area midaxillary line. The area was anesthetized with 8 mL 1%     lidocaine without epinephrine. A finder needle was then inserted until there is     evacuation of air from the pleural space. Using Seldinger technique, a 14 Latvian    pigtail chest tube catheter was inserted into the pleural space  the and secured     at the 20 cm tod.. The area was then cleaned and a pressure dressing was     applied to the site. Repeat chest x-ray shows reexpansion of lung without     pneumothorax.            Blood loss: None      Complications: None            Disposition: Admit to hospital.  Chest tube to wall suction at -20 cm of water     pressure            Procedure(s) Performed      Chest Tube:  Insertion [id number] Chest Tube            LISA ORTIZ                  2020 13:48               <Electronically signed by LISA ORTIZ MD>  20 1348      Radiographic Results               [facility]                PACS RADIOLOGY REPORT            Patient: WES DUBON   Acct: #[id number]   Report: #[id number]            UNIT #: [id number]    DOS: 20 1200      INSURANCE:MEDICARE PART A   LOCATION:[address] [place] [address]   : 1955            PROVIDERS      ADMITTING:  Lorri Saldana   ATTENDING: Lorri Saldana      FAMILY:  HARDEEP POE BRIE ARENAS   ORDERING:  LISA ORTIZ         OTHER:    DICTATING:  Krystina Sotomayor MD            REQ #:[id number]   EXAM:CXRPORTABL - Portable Chest xray 1 view      REASON FOR EXAM:  right ptx. chest tube clamped      REASON FOR VISIT:  RIGHT PNEUMOTHORAX            *******Signed******         PROCEDURE:   PORTABLE CHEST X-RAY             COMPARISON:   [facility], CR, CXR PORTABLE, 6/15/2020, 15:57.      [facility], CT, CHEST W/O CONTRAST, 3/03/2020, 8:32.  [facility],     CR, CHEST PA/AP   LAT 2V, 2020, 9:57.  [facility], CR, CXR PORTABLE, 2020,    11:16.  [facility], CR, CXR PORTABLE, 2020, 5:15.  [facility],    CR, CXR PORTABLE,       2020, 5:16.             INDICATIONS:   right ptx. chest tube clamped             FINDINGS:         Right pleural catheter remains in place.  No pneumothorax is seen.   Patchy     bibasilar opacities are       stable.  Cardiac silhouette does not appear enlarged, allowing for portable AP     technique.             CONCLUSION:         1. No definite pneumothorax.      2. Stable mild patchy bibasilar opacities, likely atelectasis and chronic     interstitial changes.              MIREILLE ZARATE MD             Electronically Signed and Approved By: MIREILLE ZARATE MD on 6/18/2020 at 12:37                           Until signed, this is an unconfirmed preliminary report that may contain      errors and is subject to change.                                              BARLA:      D:06/18/20 1237            Assessment      Notes      Changed Medications      * predniSONE (Deltasone) 10 MG TABLET:         From: 30 MG PO QDAY 30 Days #90         To: 30 MG PO QDAY 30 Days #180         Instructions: 30mg po qday x 30 days, then 20mg po qday x 30 days, then 10m        po qday x 30 days then stop      IMPRESSION:      1.  Iatrogenic right sided pneumothorax related to bronchoscopy, resolved.      2.  Chronic hypersensitivity pneumonitis, well-controlled.      3. Immunosuppressed status post steroids.      4.  MRSA pneumonia, resolving.      5. Dyspnea, resolved.      6. Cough, resolved.      7. Obesity with BMI 30.7.      8. Wheezing, resolved.      9. Gastroesophageal reflux disease without esophagitis, well-controlled.      10. Tobacco abuse with cigarettes in remission.               PLAN:      1.  We will finish a 3 month steroid taper for chronic hypersensitivity p    neumonitis with inflammatory features including lymphocyte predominant     bronchoalveolar lavage.      2. We will continue prednisone 30 mg daily and decrease prednisone by 10 mg     monthly until off steroids.        3. If she recurs afterwards, we will need to consider prolonged steroid taper,     chronic steroid use versus steroid sparing agent.      4. Finish 14 days or doxycycline for MRSA pneumonia.      5. Repeat  PFT and six minute walk test in one year.      6. Chest CT will need to be repeated down the road in about 3-6 months.      7. Up-to-date with flu vaccine. We will give her Prevnar at her next office     visit followed by Pneumovax one year after.      8. Encourage activity and diet.      9. Continue PPI.      10. Follow up in three months.            Patient Education      ACO BMI High above 25:  Encouraged weight loss, Encourage dietary changes      Patient Education Provided:  Bronchoscopy            Electronically signed by LISA ORTIZ  07/01/2020 16:31       Disclaimer: Converted document may not contain table formatting or lab diagrams. Please see [employer] for the authenticated document.

## 2021-05-28 NOTE — PROGRESS NOTES
Patient: WES DUBON     Acct: [id number]     Report: #[id number]  UNIT #: [id number]     : 1955    Encounter Date:2021  PRIMARY CARE: HARDEEP POE DO  ***Signed***  --------------------------------------------------------------------------------------------------------------------  Chief Complaint      Encounter Date      May 24, 2021            Primary Care Provider      HARDEEP POE DO            Referring Provider      HARDEEP POE DO            Patient Complaint      Patient is complaining of      Pt is here for 3 month f/u            VITALS      Height 5 ft 6 in / 167.64 cm      Weight 191 lbs  / 86.272690 kg      BSA 1.96 m2      BMI 30.8 kg/m2      Temperature 96.8 F / 36 C - Tympanic      Pulse 65      Respirations 14      Blood Pressure 121/73 Sitting, Right Arm      Pulse Oximetry 97%, room air      Initial Exhaled Nitrous Oxide      Date:  2020            HPI      The patient is a 66 year old  female with interstitial lung disease     secondary to hypersensitivity pneumonitis here for follow up.  The patient has     been doing well.  She has minimal symptoms. She denies any dyspnea, but does     report a morning cough. It is productive of thin clear sputum. She states that     the Singulair has helped somewhat.  She has not needed any inhalers.  She denies    any dyspnea, wheezing, headaches, chest pain, weight loss or hemoptysis. She did    have allergy testing done recently and is allergic to molds and dust mites.  She    is on week three of allergy shots hoping that does well.  She denies any nausea,    vomiting, fevers or chills. She is able to perform her ADLs without difficulty.     Denies any swollen glands or lymph nodes of the head and neck.            I have personally reviewed the review of systems, past family, social, surgical     and medical histories and I agree with the findings.            ROS      Constitutional:  Denies: Fatigue,  Fever, Weight gain, Weight loss, Chills,     Insomnia, Other      Respiratory/Breathing:  Complains of: Cough (productive, clear); Denies:     Shortness of air, Wheezing, Hemoptysis, Pleuritic pain, Other      Endocrine:  Denies: Polydipsia, Polyuria, Heat/cold intolerance, Abnorml     menstrual pattern, Diabetes, Other      Eyes:  Denies: Blurred vision, Vision Changes, Other      Ears, nose, mouth, throat:  Denies: Mouth lesions, Thrush, Throat pain,     Hoarseness, Allergies/Hay Fever, Post Nasal Drip, Headaches, Recent Head Injury,    Nose Bleeding, Neck Stiffness, Thyroid Mass, Hearing Loss, Ear Fullness, Dry     Mouth, Nasal or Sinus Pain, Dry Lips, Nasal discharge, Nasal congestion, Other      Cardiovascular:  Denies: Palpitations, Syncope, Claudication, Chest Pain, Wake     up Gasping for air, Leg Swelling, Irregular Heart Rate, Cyanosis, Dyspnea on     Exertion, Other      Gastrointestinal:  Denies: Nausea, Constipation, Diarrhea, Abdominal pain,     Vomiting, Difficulty Swallowing, Reflux/Heartburn, Dysphagia, Jaundice, Bloatin    g, Melena, Bloody stools, Other      Genitourinary:  Denies: Urinary frequency, Incontinence, Hematuria, Urgency,     Nocturia, Dysuria, Testicular problems, Other      Musculoskeletal:  Denies: Joint Pain, Joint Stiffness, Joint Swelling, Myalgias,    Other      Hematologic/lymphatic:  DENIES: Lymphadenopathy, Bruising, Bleeding tendencies,     Other      Neurological:  Denies: Headache, Numbness, Weakness, Seizures, Other      Psychiatric:  Denies: Anxiety, Appropriate Effect, Depression, Other      Sleep:  No: Excessive daytime sleep, Morning Headache?, Snoring, Insomnia?, Stop    breathing at sleep?, Other      Integumentary:  Denies: Rash, Dry skin, Skin Warm to Touch, Other      Immunologic/Allergic:  Denies: Latex allergy, Seasonal allergies, Asthma,     Urticaria, Eczema, Other      Immunization status:  No: Up to date            FAMILY/SOCIAL/MEDICAL HX      Surgical  History:  Yes: Cholecystectomy (LAP)      Stroke - Family Hx:  Grandparent      Heart - Family Hx:  Mother      Diabetes - Family Hx:  Brother      Cancer/Type - Family Hx:  Father, Grandparent, Uncle      Is Father Still Living?:  No      Is Mother Still Living?:  No       Family History:  Yes      Social History:  No Tobacco Use, No Alcohol Use, No Recreational Drug use      Smoking status:  Never smoker      Tubal Ligation:  Yes      Hysterectomy:  Yes      Anticoagulation Therapy:  No      Antibiotic Prophylaxis:  No      Medical History:  Yes: Arthritis, Asthma, Hemorrhoids/Rectal Prob (REFLUX,     HIATAL HERNIA), Mitral Valve Prolapse, Shortness Of Breath (COUGH, PULMONARY     FIBROSIS); No: Blood Disease, Chemotherapy/Cancer, Chronic Bronchitis/COPD,     Congestive Heart Failu, Deafness or Ringing Ears, Heart Attack, Sinus Trouble,     Miscellaneous Medical/oth      Psychiatric History      none            PREVENTION      Hx Influenza Vaccination:  Yes      Date Influenza Vaccine Given:  Oct 1, 2020      Influenza Vaccine Declined:  No      2 or More Falls in Past Year?:  No      Fall Past Year with Injury?:  No      Hx Pneumococcal Vaccination:  Yes      Encouraged to follow-up with:  PCP regarding preventative exams.      Chart initiated by      Josue Noble MA            ALLERGIES/MEDICATIONS      Allergies:        Coded Allergies:             NITROFURANTOIN (Verified  Allergy, Severe, RASH, HIVES, 3/23/21)           SULFA (SULFONAMIDE ANTIBIOTICS) (Verified  Allergy, Severe, RASH, HIVES,     3/23/21)      Medications    Last Reconciled on 5/24/21 10:30 by LISA ORTIZ MD      Perez-Fluticasone (Fluticasone 50 mcg) 16 Gm Spray.susp      2 PUFFS NARE EACH QDAY, #1 BOTTLE 0 Refills         Reported         6/16/20       Pantoprazole (Protonix) 20 Mg Tablet.dr      40 MG PO QDAY, TAB         Reported         6/16/20       Dicyclomine HCl (BENTYL) 20 Mg Tablet      20 MG PO TID PRN for ABDOMINAL CRAMPING,  #120 TAB         Reported         2/14/20       Cyclobenzaprine Hcl (Cyclobenzaprine*) 10 Mg Tablet      10 MG PO TID PRN for MUSCLE SPASMS, TAB 0 Refills         Reported         2/14/20       Atenolol (ATENOLOL) 25 Mg Tablet      25 MG PO HS, #30 TAB 0 Refills         Reported         2/14/20       Rosuvastatin Calcium (Crestor*) 10 Mg Tablet      10 MG PO HS, #30 TAB 0 Refills         Reported         2/14/20       Montelukast Sodium (Singulair*) 10 Mg Tablet      10 MG PO HS, #30 TAB 0 Refills         Reported         2/14/20       Levocetirizine (Levocetirizine) 5 Mg Tablet      5 MG PO QDAY, TAB         Reported         2/14/20      Current Medications      Current Medications Reviewed 5/24/21            EXAM      Vital Signs Reviewed.      General:  WDWN, Alert, NAD.      HEENT: PERRL, EOMI.  OP, nares clear, no sinus tenderness.      Chest: Good aeration, clear to auscultation bilaterally, tympanic to percussion     bilaterally, no work of breathing noted.      CV: RRR, no MGR, pulses 2+, equal.        Abd: Soft, NT, ND, +BS, no HSM.      EXT: No clubbing, no cyanosis, no edema.        Neuro:  A  Skin: No rashes or lesions.      Vtials      Vitals:             Height 5 ft 6 in / 167.64 cm           Weight 191 lbs  / 86.112645 kg           BSA 1.96 m2           BMI 30.8 kg/m2           Temperature 96.8 F / 36 C - Tympanic           Pulse 65           Respirations 14           Blood Pressure 121/73 Sitting, Right Arm           Pulse Oximetry 97%, room air            REVIEW      Results Reviewed      PCCS Results Reviewed?:  Yes Prev Lab Results, Yes Prev Radiology Results, Yes     [facility]      Lab Results      I personally reviewed Jean-Pierre Mtz and my last office visit note.  I     personally reviewed pulmonary function test from 03/2021 showing a moderate     restrictive lung defect with no changes from last year.            Assessment      Notes      Discontinued Medications      *  AZELASTINE HCL (Azelastine Nasal) 137 MCG/0.137 ML SPRAY.PUMP: 1 PUFFS NARE       EACH BID #30      IMPRESSION:      1. Chronic hypersensitivity pneumonitis/ILD, well-controlled with minimal     symptoms.  She had a course of steroids in the past and appears to be in     remission.      2. Chronic cough, minimal and at baseline.      3. Seasonal allergies, well-controlled, now on allergy immunotherapy.      4. Gastroesophageal reflux disease without esophagitis well-controlled on proton    pump inhibitor.      5. Tobacco abuse with cigarettes in remission.      6. Obesity, BMI 30.8.      7.  0.6 cm lung nodule.              PLAN:      1.  One year follow up for 0.6 cm lung nodule and follow up for interstitial     lung disease ordered in 10/2021. We will follow up after this.      2.  Continue Singulair, OTC histamine, allergy and immunotherapy.      3. PFTs and six minute walk test will be due on 03/2022. We can arrange at next     follow up visit.      4. Up-to-date with flu, Prevnar, Pneumovax. and COVID19 vaccination.      5. Continue proton pump inhibitor.      6.  Three minutes of diet and exercise counseling provided today.  Recommend 30     minutes of daily exercise as well as a 1800 calorie a day low fat diet.  The     patient verbalized understanding and will make attempts to lose weight.        7. Follow up with me after her CAT scan in October.            Patient Education      ACO BMI High above 25:  Counseling Given, Encouraged weight loss, Encourage     dietary changes            Electronically signed by LISA ORTIZ  05/26/2021 08:27       Disclaimer: Converted document may not contain table formatting or lab diagrams. Please see [employer] for the authenticated document.

## 2021-05-28 NOTE — PROGRESS NOTES
Patient: WES DUBON     Acct: [id number]     Report: #[id number]  UNIT #: [id number]     : 1955    Encounter Date:2020  PRIMARY CARE: HARDEEP POE DO  ***Signed***  --------------------------------------------------------------------------------------------------------------------  Chief Complaint      Encounter Date      Sep 30, 2020            Primary Care Provider      HARDEEP POE DO            Referring Provider      HARDEEP POE DO            Patient Complaint      Patient is complaining of      3 month f/u            VITALS      Height 5 ft 6 in / 167.64 cm      Weight 192 lbs  / 87.568320 kg      BSA 1.97 m2      BMI 31.0 kg/m2      Temperature 98.0 F / 36.67 C - Tympanic      Pulse 76      Respirations 18      Blood Pressure 140/95 Sitting, Left Arm      Pulse Oximetry 98%, room air      Initial Exhaled Nitrous Oxide      Date:  2020            HPI      The patient is a 65 year old  female with interstitial lung disease     secondary to chronic hypersensitivity pneumonitis here for follow up.  Since     last visit she has finished steroid taper. Her cough is much better. It is     intermittent and dry and is allergy related.  She is now on antihistamines and     Singulair.  She has scant cough in the morning with thin clear sputum. She     denies any dyspnea, wheezing, headaches, chest pain, nausea, vomiting or     hemoptysis. Overall she is doing great.  Denies any nausea, vomiting, fevers,     chills, headaches, chest pain or weight loss.  She is able to perform her ADLs     without difficulty.  Denies any swollen glands or lymph nodes of the head and     neck.            I have personally reviewed the review of systems, past family, social, surgical     and medical histories and I agree with the findings.            ROS      Constitutional:  Denies: Fatigue, Fever, Weight gain, Weight loss, Chills,     Insomnia, Other      Respiratory/Breathing:   Complains of: Cough; Denies: Shortness of air, Wheezing,    Hemoptysis, Pleuritic pain, Other      Endocrine:  Denies: Polydipsia, Polyuria, Heat/cold intolerance, Abnorml     menstrual pattern, Diabetes, Other      Eyes:  Denies: Blurred vision, Vision Changes, Other      Ears, nose, mouth, throat:  Denies: Mouth lesions, Thrush, Throat pain,     Hoarseness, Allergies/Hay Fever, Post Nasal Drip, Headaches, Recent Head Injury,    Nose Bleeding, Neck Stiffness, Thyroid Mass, Hearing Loss, Ear Fullness, Dry     Mouth, Nasal or Sinus Pain, Dry Lips, Nasal discharge, Nasal congestion, Other      Cardiovascular:  Denies: Palpitations, Syncope, Claudication, Chest Pain, Wake     up Gasping for air, Leg Swelling, Irregular Heart Rate, Cyanosis, Dyspnea on     Exertion, Other      Gastrointestinal:  Denies: Nausea, Constipation, Diarrhea, Abdominal pain,     Vomiting, Difficulty Swallowing, Reflux/Heartburn, Dysphagia, Jaundice,     Bloating, Melena, Bloody stools, Other      Genitourinary:  Denies: Urinary frequency, Incontinence, Hematuria, Urgency,     Nocturia, Dysuria, Testicular problems, Other      Musculoskeletal:  Denies: Joint Pain, Joint Stiffness, Joint Swelling, Myalgias,    Other      Hematologic/lymphatic:  DENIES: Lymphadenopathy, Bruising, Bleeding tendencies,     Other      Neurological:  Denies: Headache, Numbness, Weakness, Seizures, Other      Psychiatric:  Denies: Anxiety, Appropriate Effect, Depression, Other      Sleep:  No: Excessive daytime sleep, Morning Headache?, Snoring, Insomnia?, Stop    breathing at sleep?, Other      Integumentary:  Denies: Rash, Dry skin, Skin Warm to Touch, Other      Immunologic/Allergic:  Denies: Latex allergy, Seasonal allergies, Asthma, Ur    ticaria, Eczema, Other      Immunization status:  No: Up to date            FAMILY/SOCIAL/MEDICAL HX      Surgical History:  Yes: Cholecystectomy (LAP)      Stroke - Family Hx:  Grandparent      Heart - Family Hx:  Mother       Diabetes - Family Hx:  Brother      Cancer/Type - Family Hx:  Father, Grandparent, Uncle      Is Father Still Living?:  No      Is Mother Still Living?:  No       Family History:  Yes      Social History:  No Tobacco Use, No Alcohol Use, No Recreational Drug use      Smoking status:  Never smoker      Tubal Ligation:  Yes      Hysterectomy:  Yes      Anticoagulation Therapy:  No      Antibiotic Prophylaxis:  No      Medical History:  Yes: Arthritis, Asthma, Hemorrhoids/Rectal Prob (REFLUX,     HIATAL HERNIA), Mitral Valve Prolapse, Shortness Of Breath (COUGH, PULMONARY     FIBROSIS); No: Blood Disease, Chemotherapy/Cancer, Chronic Bronchitis/COPD,     Congestive Heart Failu, Deafness or Ringing Ears, Heart Attack, Sinus Trouble,     Miscellaneous Medical/oth      Psychiatric History      none            PREVENTION      Hx Influenza Vaccination:  Yes      Date Influenza Vaccine Given:  Dec 1, 2019      Influenza Vaccine Declined:  No      2 or More Falls in Past Year?:  No      Fall Past Year with Injury?:  No      Hx Pneumococcal Vaccination:  No      Encouraged to follow-up with:  PCP regarding preventative exams.      Chart initiated by      Azeb Figueroa CMA            ALLERGIES/MEDICATIONS      Allergies:        Coded Allergies:             NITROFURANTOIN (Verified  Allergy, Severe, RASH, HIVES, 9/30/20)           SULFA (SULFONAMIDE ANTIBIOTICS) (Verified  Allergy, Severe, RASH, HIVES,     9/30/20)      Medications    Last Reconciled on 9/30/20 09:25 by LISA ORTIZ MD      Perez-Fluticasone (Fluticasone 50 mcg) 16 Gm Spray.susp      2 PUFFS NARE EACH QDAY, #1 BOTTLE 0 Refills         Reported         6/16/20       Ibuprofen (Ibuprofen) 200 Mg Tab      400 MG PO Q8H PRN for PAIN/CRAMPS, #100 TAB 0 Refills         Reported         6/16/20       Pantoprazole (Protonix) 20 Mg Tablet.dr      40 MG PO QDAY, TAB         Reported         6/16/20       Dicyclomine HCl (BENTYL) 20 Mg Tablet      20 MG PO TID PRN for  ABDOMINAL CRAMPING, #120 TAB         Reported         2/14/20       Cyclobenzaprine Hcl (Cyclobenzaprine*) 10 Mg Tablet      10 MG PO TID PRN for MUSCLE SPASMS, TAB 0 Refills         Reported         2/14/20       Atenolol (ATENOLOL) 25 Mg Tablet      25 MG PO HS, #30 TAB 0 Refills         Reported         2/14/20       Rosuvastatin Calcium (Crestor*) 10 Mg Tablet      10 MG PO HS, #30 TAB 0 Refills         Reported         2/14/20       Montelukast Sodium (Singulair*) 10 Mg Tablet      10 MG PO HS, #30 TAB 0 Refills         Reported         2/14/20       Levocetirizine (Levocetirizine) 5 Mg Tablet      5 MG PO QDAY, TAB         Reported         2/14/20      Current Medications      Current Medications Reviewed 9/30/20            EXAM      Vital Signs Reviewed.      General:  WDWN, Alert, NAD.      HEENT: PERRL, EOMI.  OP, nares clear, no sinus tenderness.      Neck: Supple, no JVD, no thyromegaly.      Chest: Good aeration, clear to auscultation bilaterally, tympanic to percussion     bilaterally, no work of breathing noted.      CV: RRR, no MGR, pulses 2+, equal.        Abd: Obese, soft, NT, ND, +BS, no HSM.      EXT: No clubbing, no cyanosis, no edema.        Neuro:  A  Skin: No rashes or lesions.      Vtials      Vitals:             Height 5 ft 6 in / 167.64 cm           Weight 192 lbs  / 87.592179 kg           BSA 1.97 m2           BMI 31.0 kg/m2           Temperature 98.0 F / 36.67 C - Tympanic           Pulse 76           Respirations 18           Blood Pressure 140/95 Sitting, Left Arm           Pulse Oximetry 98%, room air            REVIEW      Results Reviewed      P[facility]S Results Reviewed?:  Yes Prev Lab Results, Yes Prev Radiology Results, Yes     Previous [facility] Records      Lab Results      I personally reviewed my last office note.            Assessment      ILD (interstitial lung disease) - J84.9            Notes      Discontinued Medications      * Doxycycline Hyclate (Doxycycline Hyclate*) 100 MG  CAPSULE: 100 MG PO Q12HR 14       Days #28      * predniSONE (Deltasone) 10 MG TABLET: 30 MG PO QDAY 30 Days #180         Instructions: 30mg po qday x 30 days, then 20mg po qday x 30 days, then 10m        po qday x 30 days then stop      New Diagnostics      * PFT-Comp, PrePost,DLCO,BodyBox, 6 Months         Dx: ILD (interstitial lung disease) - J84.9      * Chest W/O Cont CT, As Soon As Possible         Dx: ILD (interstitial lung disease) - J84.9      * 6 Min Walk With Pulse Ox, Routine         Dx: ILD (interstitial lung disease) - J84.9      * [facility] Pre-Op Covid Screening, Routine         Dx: Encounter for screening for other viral diseases - Z11.59      New Office Procedures      * Prevnar-13, As Soon As Possible         Pneumoc 13-Nara Conj-Dip CRm/Pf (Prevnar 13 Syringe) 0.5 ML SYRINGE: 0.5        MILLILITER INTRAMUSCULARLY Qty 1 SYRINGE         Dx: ILD (interstitial lung disease) - J84.9      * Fluzone Vaccine High-Dose, Stat         Flu Vacc (65Yr Up)/Pf (Fluzone High-Dose Syr) 180 MCG/0.5 ML SYRINGE: 180        MICROGRAM INTRAMUSCULARLY Qty 1 SYRINGE         Dx: ILD (interstitial lung disease) - J84.9      IMPRESSION:      1.  Chronic hypersensitivity pneumonitis, well-controlled, status post course of    steroids and disease that appears to be in remission.         2.  Chronic cough.      3. Seasonal allergies, well-controlled.      4. Obesity, BMI 30.1.      5. Gastroesophageal reflux disease without esophagitis, well-controlled.      6. Tobacco abuse with cigarettes in remission.              PLAN:      1.  Completed a steroid taper and no indication for further immunosuppression.      2.  We will treat with prednisone taper if she has any symptoms down the road.      3. Continue Singulair and OTC antihistamine.      4. Check noncontrast chest CT now to reassess lung parenchyma after a course of     steroids.      5. Repeat pulmonary function test and six minute walk test in 03/2021.      6. We will give her  Prevnar and Fluzone given to patient in the office today.      Pneumovax will be due as early as 09/2021.      7.  Continue PPI.      8. Four minutes of diet and exercise counseling provided today.  Recommend 30     minutes of daily exercise as well as a 1800 calorie a day low fat diet.  The     patient verbalized understanding and will make attempts to lose weight.        9.  Follow up with me in 03/2021 after PFTs.            Patient Education      ACO BMI High above 25:  Counseling Given, Encouraged weight loss, Encourage     dietary changes            Electronically signed by LISA ORTIZ  10/01/2020 15:29       Disclaimer: Converted document may not contain table formatting or lab diagrams. Please see [employer] for the authenticated document.

## 2021-05-28 NOTE — PROGRESS NOTES
Patient: WES DUBON     Acct: [id number]     Report: #[id number]  UNIT #: [id number]     : 1955    Encounter Date:2021  PRIMARY CARE: HARDEEP POE DO  ***Signed***  --------------------------------------------------------------------------------------------------------------------  Chief Complaint      Encounter Date      Mar 23, 2021            Primary Care Provider      HARDEEP POE DO            Referring Provider      HARDEEP POE DO            Patient Complaint      Patient is complaining of      PT here today for F/U, ILD (interstitial lung disease)            VITALS      Height 66 in / 167.64 cm      Weight 200 lbs  / 90.97782 kg      BSA 2.00 m2      BMI 32.3 kg/m2      Temperature 97.5 F / 36.39 C - Temporal      Pulse 74      Respirations 16      Blood Pressure 129/68 Sitting, Left Arm      Pulse Oximetry 99%, room air      Initial Exhaled Nitrous Oxide      Date:  2020            HPI      The patient is a 65 year old female patient of Dr. Choi who has     interstitial lung disease secondary to chronic hypersensitivity pneumonitis who     presents for follow up visit today. The patient states she is now off her     steroid taper. The patient states she still has intermittent cough and is having    a lot of postnasal drip.  The patient states her cough is mainly clear at times     but will also sometimes have episodes with white sputum. The patient states that    she has a significant amount of nasal congestion. The patient states that she is    taking levocetirizine and Singulair everyday.  The patient states that she used     to be on allergy shots in her early 20s, however she has not been on allergy     shots for a number of years and has not seen an allergist and would like to be     referred back to an allergist for allergy testing. The patient denies any     wheezing, fever, chills, night sweats, hemoptysis, purulent sputum production,     chest pain,  chest tightness, swollen glands in the head and neck, abdominal     pain, nausea, vomiting or diarrhea.   The patient denies any headaches,     myalgias, sore throat, changes in sense of taste and/or smell or any other     coronavirus or flu-like symptoms.  The patient denies any leg swelling,     paroxysmal nocturnal dyspnea or orthopnea.  The patient states she is able to     perform ADLs without difficulty.  The patient had a chest CT scan performed on     10/05/2020 and report states that there is evidence of chronic interstitial lung    disease such as UIP or NSIP.  There is a 0.6 cm nodule in the left lower lobe     that is unchanged.  The patient also had a pulmonary function test that was     completed on 03/01/2021 and compared testing back in 03/2020. There had been no     significant changes in lung volumes, spirometry or diffusion capacity. The     patient also had a six minute walk test completed on the same day and patient     did not qualify for oxygen.            I have personally reviewed the review of systems, past family, social, surgical     and medical histories and I agree with those as entered in the chart.      Copies To:   LISA ORTIZ      Constitutional:  Denies: Fatigue, Fever, Weight gain, Weight loss, Chills,     Insomnia, Other      Respiratory/Breathing:  Complains of: Shortness of air, Cough; Denies: Wheezing,    Hemoptysis, Pleuritic pain, Other      Endocrine:  Denies: Polydipsia, Polyuria, Heat/cold intolerance, Abnorml menstru    al pattern, Diabetes, Other      Eyes:  Denies: Blurred vision, Vision Changes, Other      Ears, nose, mouth, throat:  Denies: Mouth lesions, Thrush, Throat pain,     Hoarseness, Allergies/Hay Fever, Post Nasal Drip, Headaches, Recent Head Injury,    Nose Bleeding, Neck Stiffness, Thyroid Mass, Hearing Loss, Ear Fullness, Dry     Mouth, Nasal or Sinus Pain, Dry Lips, Nasal discharge, Nasal congestion, Other      Cardiovascular:  Denies:  Palpitations, Syncope, Claudication, Chest Pain, Wake     up Gasping for air, Leg Swelling, Irregular Heart Rate, Cyanosis, Dyspnea on     Exertion, Other      Gastrointestinal:  Denies: Nausea, Constipation, Diarrhea, Abdominal pain,     Vomiting, Difficulty Swallowing, Reflux/Heartburn, Dysphagia, Jaundice,     Bloating, Melena, Bloody stools, Other      Genitourinary:  Denies: Urinary frequency, Incontinence, Hematuria, Urgency,     Nocturia, Dysuria, Testicular problems, Other      Musculoskeletal:  Denies: Joint Pain, Joint Stiffness, Joint Swelling, Myalgias,    Other      Hematologic/lymphatic:  DENIES: Lymphadenopathy, Bruising, Bleeding tendencies,     Other      Neurological:  Denies: Headache, Numbness, Weakness, Seizures, Other      Psychiatric:  Denies: Anxiety, Appropriate Effect, Depression, Other      Sleep:  No: Excessive daytime sleep, Morning Headache?, Snoring, Insomnia?, Stop    breathing at sleep?, Other      Integumentary:  Denies: Rash, Dry skin, Skin Warm to Touch, Other      Immunologic/Allergic:  Denies: Latex allergy, Seasonal allergies, Asthma,     Urticaria, Eczema, Other      Immunization status:  No: Up to date            FAMILY/SOCIAL/MEDICAL HX      Surgical History:  Yes: Cholecystectomy (LAP)      Stroke - Family Hx:  Grandparent      Heart - Family Hx:  Mother      Diabetes - Family Hx:  Brother      Cancer/Type - Family Hx:  Father, Grandparent, Uncle      Is Father Still Living?:  No      Is Mother Still Living?:  No       Family History:  Yes      Social History:  No Tobacco Use, No Alcohol Use, No Recreational Drug use      Smoking status:  Never smoker      Tubal Ligation:  Yes      Hysterectomy:  Yes      Anticoagulation Therapy:  No      Antibiotic Prophylaxis:  No      Medical History:  Yes: Arthritis, Asthma, Hemorrhoids/Rectal Prob (REFLUX,     HIATAL HERNIA), Mitral Valve Prolapse, Shortness Of Breath (COUGH, PULMONARY     FIBROSIS); No: Blood Disease,  Chemotherapy/Cancer, Chronic Bronchitis/COPD,     Congestive Heart Failu, Deafness or Ringing Ears, Heart Attack, Sinus Trouble,     Miscellaneous Medical/oth      Psychiatric History      none            PREVENTION      Hx Influenza Vaccination:  Yes      Date Influenza Vaccine Given:  Oct 1, 2020      Influenza Vaccine Declined:  No      2 or More Falls in Past Year?:  No      Fall Past Year with Injury?:  No      Hx Pneumococcal Vaccination:  Yes      Encouraged to follow-up with:  PCP regarding preventative exams.      Chart initiated by      Keyonna Simpson CMA            ALLERGIES/MEDICATIONS      Allergies:        Coded Allergies:             NITROFURANTOIN (Verified  Allergy, Severe, RASH, HIVES, 3/23/21)           SULFA (SULFONAMIDE ANTIBIOTICS) (Verified  Allergy, Severe, RASH, HIVES,     3/23/21)      Medications    Last Reconciled on 3/23/21 10:49 by Wilner RICHMONDelastine Hcl (Azelastine Nasal) 137 Mcg/0.137 Ml Spray.pump      1 PUFFS NARE EACH BID, #30 ML 3 Refills         Prov: KATHARINE HARKINS PCCS         3/23/21       Perez-Fluticasone (Fluticasone 50 mcg) 16 Gm Spray.susp      2 PUFFS NARE EACH QDAY, #1 BOTTLE 0 Refills         Reported         6/16/20       Ibuprofen (Ibuprofen) 200 Mg Tab      400 MG PO Q8H PRN for PAIN/CRAMPS, #100 TAB 0 Refills         Reported         6/16/20       Pantoprazole (Protonix) 20 Mg Tablet.dr      40 MG PO QDAY, TAB         Reported         6/16/20       Dicyclomine HCl (BENTYL) 20 Mg Tablet      20 MG PO TID PRN for ABDOMINAL CRAMPING, #120 TAB         Reported         2/14/20       Cyclobenzaprine Hcl (Cyclobenzaprine*) 10 Mg Tablet      10 MG PO TID PRN for MUSCLE SPASMS, TAB 0 Refills         Reported         2/14/20       Atenolol (ATENOLOL) 25 Mg Tablet      25 MG PO HS, #30 TAB 0 Refills         Reported         2/14/20       Rosuvastatin Calcium (Crestor*) 10 Mg Tablet      10 MG PO HS, #30 TAB 0 Refills         Reported         2/14/20        Montelukast Sodium (Singulair*) 10 Mg Tablet      10 MG PO HS, #30 TAB 0 Refills         Reported         2/14/20       Levocetirizine (Levocetirizine) 5 Mg Tablet      5 MG PO QDAY, TAB         Reported         2/14/20      Current Medications      Current Medications Reviewed 3/23/21            EXAM      Vital Signs Reviewed.      General:  WDWN, Alert, NAD.      HEENT: PERRL, EOMI.  OP, nares with mildly swollen turbinates with clear     drainage, no sinus tenderness.      Neck: Supple, no JVD, no thyromegaly.      Lymph: No axillary, cervical, supraclavicular lymphadenopathy noted bilaterally.      Chest: Good aeration, clear to auscultation bilaterally, tympanic to percussion     bilaterally, no work of breathing noted.      CV: RRR, no MGR, pulses 2+, equal.        Abd: Soft, NT, ND, +BS, no HSM.      EXT: No clubbing, no cyanosis, no edema, no joint tenderness.        Neuro:  A  Skin: No rashes or lesions.      Vtials      Vitals:             Height 66 in / 167.64 cm           Weight 200 lbs  / 90.55164 kg           BSA 2.00 m2           BMI 32.3 kg/m2           Temperature 97.5 F / 36.39 C - Temporal           Pulse 74           Respirations 16           Blood Pressure 129/68 Sitting, Left Arm           Pulse Oximetry 99%, room air            REVIEW      Results Reviewed      PCCS Results Reviewed?:  Yes Prev Lab Results, Yes Prev Radiology Results, Yes     [facility]      Lab Results      I reviewed patient's chest CT report dated 10/05/2020.  I reviewed patient's     pulmonary function test and six minute walk test dated 03/05/2021.  I reviewed     Dr. Robison's last office visit note.      Radiographic Results               [facility]                PACS RADIOLOGY REPORT            Patient: WES DUBON   Acct: #[id number]   Report: #[id number]            UNIT #: [id number]    DOS: 10/05/20 1401      INSURANCE:MEDICARE PART A    LOCATION:CT     : 1955            PROVIDERS      ADMITTING:     ATTENDING: LISA ORTIZ      FAMILY:  HARDEEP POE DO   ORDERING:  LISA ORTIZ         OTHER:    DICTATING:  Rachid Rosales MD            REQ #:[id number]   EXAM:[facility] - CT CHEST without CONTRAST      REASON FOR EXAM:  PULMONARY DISEASE      REASON FOR VISIT:  PULMONARY DISEASE            *******Signed******         PROCEDURE:   CT CHEST WITHOUT CONTRAST             COMPARISON:   Other, CT, CHEST W/ CONTRAST, 2019, 5:25.  [facility], CT, CHEST       W/O CONTRAST, 3/03/2020, 8:32.             INDICATIONS:   PULMONARY DISEASE             TECHNIQUE:   CT images were created without the administration of contrast     material.               PROTOCOL:     Standard imaging protocol performed                RADIATION:     DLP: 534mGy*cm          Automated exposure control was utilized to minimize radiation dose.              FINDINGS:         No adenopathy or effusion is identified.               Septal thickening is noted in the mid and lower lung fields bilaterally.  In the    left lower lobe on       image 40 is a 0.6 cm nodule, stable since 3/3/2020.  No honeycombing is     demonstrated.  No acute       infiltrate is seen.  Calcified granulomas are noted elsewhere in the left lower     lobe.             A cholecystectomy has been performed.  No focal osseous lesion is seen.             CONCLUSION:         1. Evidence of chronic interstitial lung disease such as usual interstitial     pneumonitis or       nonspecific interstitial pneumonia      2. 0.6 cm nodule in the left lower lobe, unchanged.  A follow-up chest CT in 1     year is recommended       to re-evaluate      3. Previous cholecystectomy              Rachid Rosales M.D.             Electronically Signed and Approved By: Rachid Rosales M.D. on 10/05/2020 at 16:29                                  Until signed, this is an unconfirmed preliminary report that may  contain      errors and is subject to change.                                              WURRO:      D:10/05/20 1629      PFT Results      Patient: RACH DUBON   Acct: #[id number]   Report: #[id number]            UNIT #: [id number]    ADMIT/REGISTRATION DATE: 21      LOCATION:[facility]     : 1955            PROVIDERS      ADMITTING:     FAMILY:  HARDEEP POE, DO         OTHER:       DICTATING:  LISA ORTIZ MD            REASON FOR VISIT:  INTERSTICIAL PULMONARY DISEASE            *******Signed******                                     [facility]                          [employer] Management Services                            [place], [place]  [address]               __________________________________________________________________________             Patient Name:                   Attending Physician:      KishoreRach saenz                 LISA ORTIZ M.D.             Patient Visit # MR #            Admit Date  Disch Date     Location      [id number]    [id number]      2021                 [facility]- -             Date of Birth      1955      __________________________________________________________________________      821 - DIAGNOSTIC REPORT             PULMONARY FUNCTION TEST             SPIROMETRY:      No obstructive lung defect noted.      FEV1 2.28 L/87% predicted.      Forced vital capacity is at the lower limits of normal.      No bronchodilator response noted.             FLOW VOLUME LOOP:      Flow volume loop shows restriction.             LUNG VOLUMES:      Mild restrictive lung defect noted.      Total lung capacity is 4.14 L/76% predicted.             DIFFUSION CAPACITY:      Diffusion capacity is moderately reduced at 58% predicted.             OVERALL IMPRESSION:      1.  Mild restrictive lung defect with no bronchodilator response noted.      2.  Diffusion capacity is moderately reduced.             COMPARISON:      Compared to  testing done in March 2020 there have been no significant changes      in lung volumes, spirometry, or diffusion capacity.             To be electronically signed in [facility]      [id number] LISA ORTIZ M.D.             AM:      D:  03/02/2021 10:23      T:  03/02/2021 11:20      #[id number]             Until signed, this is an unconfirmed preliminary report that may contain      errors and is subject to change.                   Until signed, this is an unconfirmed preliminary report that may contain      errors and is subject to change.                     <Electronically signed by LISA ORTIZ MD>                03/02/21 1138               LISA ORTIZ MD                                                                  [facility]      D:03/02/21 1023            Assessment      ILD (interstitial lung disease) - J84.9            Lung nodule - R91.1            Seasonal allergic rhinitis - J30.2            Notes      New Medications      * AZELASTINE HCL (Azelastine Nasal) 137 MCG/0.137 ML SPRAY.PUMP: 1 PUFFS NARE       EACH BID #30      New Diagnostics      * Chest W/O Cont CT, 9 Months         Dx: ILD (interstitial lung disease) - J84.9      New Referrals      * Immunology/Allergy, Routine         Dx: Seasonal allergic rhinitis - J30.2      ASSESSMENT:       1. Chronic hypersensitivity pneumonitis, well-controlled, status post course of     steroids and disease that appears to be in remission.      2.  Chronic cough.      3. Seasonal allergies.      4. Allergic rhinitis.      5. Gastroesophageal reflux disease without esophagitis, patient on proton pump     inhibitor.      6. Tobacco abuse with cigarettes in remission.      7. Postnasal drip.      8.  Nasal congestion.        9. Pulmonary nodule.        10. Obesity with a BMI of 32.3.              PLAN:      1. The patient has already completed steroid taper, no indication for further     immunosuppression.       2. If patient develops any symptoms down the  road, we will treat with a predni    sone taper.      3. The patient to continue Singulair and OTC levocetirizine as prescribed.      4. For patient's postnasal drip and nasal congestion, I will start patient on     azelastine nasal spray one puff twice a day in each nares.  I will also refer     patient to allergist for  allergy testing as requested.      5. The patient to continue proton pump inhibitor for gastroesophageal reflux     disease, to sleep with the head of bed elevated and no eat 3-4 prior to bedtime.      6. The patient is up-to-date with Prevnar and Fluzone.  The patient will be due     for Pneumovax in early 09/2021.      7.  The patient's pulmonary function test did not show any significant changes     in lung volumes, spirometry or diffusion capacity.  We will recheck pulmonary     function tests again in one year.        8.  The patient with 0.6 cm pulmonary nodule.  Recheck CT scan of chest in     10/2021, order placed today.      9.  Patient is advised to call the office, 911 or go to the ER with any new or     worsening symptoms.        10.  I spent three minutes counseling patient on diet and exercise. Patient's     BMI and weight were discussed.  The patient was counseled on initiating and     intensifying attempts to lose weight.  Patient was counseled about the risks of     obesity and advised to decrease caloric intake by 500 calories a day, eat three     small meals a day and advised to minimize snacking.  I recommended 30-60 minutes    of daily exercise.  The patient refuses referral to dietician at this time.      11. The patient is advised to receive the COVID19 vaccine when it becomes     available and to continue to follow CDC recommendations of social distancing,     wearing a mask and washing hands for at least 20 seconds.        12. Follow up in 2-3 months, sooner if needed.            Patient Education      ACO BMI High above 25:  Counseling Given, Encouraged weight loss,  Encourage     dietary changes      Patient Education Provided:  Acute Bronchitis      Time Spent:  > 50% /Coord Care            Electronically signed by KATHARINE HARKINS PCCS  03/24/2021 16:28       Disclaimer: Converted document may not contain table formatting or lab diagrams. Please see [facility] for the authenticated document.

## 2021-10-05 ENCOUNTER — HOSPITAL ENCOUNTER (OUTPATIENT)
Dept: CT IMAGING | Facility: HOSPITAL | Age: 66
Discharge: HOME | End: 2021-10-05
Admitting: NURSE PRACTITIONER

## 2021-10-05 DIAGNOSIS — J84.9 INTERSTITIAL PULMONARY DISEASE (HCC): ICD-10-CM

## 2021-10-05 PROCEDURE — 71250 CT THORAX DX C-: CPT

## 2021-10-06 DIAGNOSIS — R91.1 LUNG NODULE: ICD-10-CM

## 2021-10-06 DIAGNOSIS — J84.9 ILD (INTERSTITIAL LUNG DISEASE) (HCC): Primary | ICD-10-CM

## 2021-10-15 NOTE — PROGRESS NOTES
Primary Care Provider  En Blank DO     Referring Provider  No ref. provider found     Chief Complaint  Interstitial lung disease and Results (Chest CT results )    Subjective          History of Presenting Illness  Patient is a 56-year-old  female, patient of Dr. Robison with interstitial lung disease secondary to hypersensitivity pneumonitis who presents for follow-up visit today.  Patient states that since last visit her breathing is doing well.  Patient states she is taking Xyzal and Singulair for seasonal allergies and is also receiving allergy shots from [facility]. Since last office visit patient had a chest CT scan completed on 10/5/2021.  Chest CT report showed no significant change in coarsened interstitial markings in both lung bases compatible with patient's history of interstitial lung disease.  No new or worsening airspace consolidation.  Stable 6 mm noncalcified pulmonary nodule within the left lower lobe. Repeat noncontrast CT scan of the chest would be recommended in 12 months. Patient denies dyspnea, cough, wheezing, fever, chills, night sweats, swollen glands in the head and neck, unintentional weight loss, hemoptysis, purulent sputum production, dysphagia, chest pain, palpitations, chest tightness, abdominal pain, nausea, vomiting, and diarrhea.  Patient also denies any myalgias, changes in sense of taste and/or smell, sore throat, any other coronavirus or flu-like symptoms.  Patient denies any leg swelling, orthopnea, paroxysmal nocturnal dyspnea.  Patient is able to perform activities of daily living.    Review of Systems   Constitutional: Negative for activity change, appetite change, chills, diaphoresis, fatigue, fever, unexpected weight gain and unexpected weight loss.        Negative for Insomnia   HENT: Negative for congestion (Nasal), mouth sores, nosebleeds, postnasal drip, sore throat, swollen glands and trouble swallowing.         Negative for  Thrush  Negative for Hoarseness  Negative for Allergies/Hay Fever  Negative for Recent Head injury  Negative for Ear Fullness  Negative for Nasal or Sinus pain  Negative for Dry lips  Negative for Nasal discharge   Respiratory: Negative for apnea, cough, chest tightness, shortness of breath and wheezing.         Negative for Hemoptysis  Negative for Pleuritic pain   Cardiovascular: Negative for chest pain, palpitations and leg swelling.        Negative for Claudication  Negative for Cyanosis  Negative for Dyspnea on exertion   Gastrointestinal: Negative for abdominal pain, diarrhea, nausea, vomiting and GERD.   Musculoskeletal: Negative for joint swelling and myalgias.        Negative for Joint pain  Negative for Joint stiffness   Skin: Negative for color change, dry skin, pallor and rash.   Neurological: Negative for syncope, weakness and headache.   Hematological: Negative for adenopathy. Does not bruise/bleed easily.        History reviewed. No pertinent family history.     Social History     Socioeconomic History   • Marital status:    Tobacco Use   • Smoking status: Never Smoker   • Smokeless tobacco: Never Used   Vaping Use   • Vaping Use: Never used   Substance and Sexual Activity   • Alcohol use: Defer   • Drug use: Defer   • Sexual activity: Defer        Past Medical History:   Diagnosis Date   • Interstitial lung disease (HCC)         Immunization History   Administered Date(s) Administered   • COVID-19 (MODERNA) 12/28/2020, 01/26/2021       Allergies   Allergen Reactions   • Gemfibrozil Myalgia     Muscle/joint pain   • Nitrofurantoin Hives   • Sulfamethoxazole-Trimethoprim Hives          Current Outpatient Medications:   •  atenolol (TENORMIN) 25 MG tablet, Take 25 mg by mouth Daily., Disp: , Rfl:   •  cyclobenzaprine (FLEXERIL) 10 MG tablet, Take 10 mg by mouth., Disp: , Rfl:   •  dicyclomine (BENTYL) 20 MG tablet, Take 20 mg by mouth., Disp: , Rfl:   •  estradiol (ESTRACE) 1 MG tablet, Take 1 mg  "by mouth Daily., Disp: , Rfl:   •  levocetirizine (XYZAL) 5 MG tablet, Take 5 mg by mouth., Disp: , Rfl:   •  montelukast (SINGULAIR) 10 MG tablet, Take 10 mg by mouth., Disp: , Rfl:   •  pantoprazole (PROTONIX) 40 MG EC tablet, Take 40 mg by mouth., Disp: , Rfl:   •  Phenylephrine-guaiFENesin  MG tablet, , Disp: , Rfl:   •  rosuvastatin (CRESTOR) 10 MG tablet, , Disp: , Rfl:   •  terbinafine (lamiSIL) 250 MG tablet, Take 250 mg by mouth Daily., Disp: , Rfl:      Objective     Physical Exam  Vital Signs Reviewed  WDWN, Alert, NAD.    HEENT:  PERRL, EOMI.  OP, nares clear, no sinus tenderness  Neck:  Supple, no JVD, no thyromegaly.  Lymph: no axillary, cervical, supraclavicular lymphadenopathy noted bilaterally  Chest:  good aeration, clear to auscultation bilaterally, tympanic to percussion bilaterally, no work of breathing noted  CV: RRR, no MGR, pulses 2+, equal.  Abd:  Soft, NT, ND, + BS, no HSM  EXT:  no clubbing, no cyanosis, no edema, no joint tenderness  Neuro:  A&Ox3, CN grossly intact, no focal deficits.  Skin: No rashes or lesions noted.    Vital Signs:   /85   Pulse 83   Resp 14   Ht 167.6 cm (66\")   Wt 81.6 kg (180 lb)   SpO2 100% Comment: room air  BMI 29.05 kg/m²         Result Review :   I have personally reviewed Dr. Robison's last office visit note.  I also reviewed chest CT report dated from 10/5/2021.  See scanned report.         Assessment and Plan      Assessment:  1. Chronic hypersensitivity pneumonitis/ILD, well-controlled with minimal symptoms.  She had a course of steroids in the past and appears to be in remission.      2. Chronic cough, minimal and at baseline.      3. Seasonal allergies, now on allergy immunotherapy.      4. Gastroesophageal reflux disease without esophagitis well-controlled on proton pump inhibitor.      5. 0.6 cm lung nodule.  Stable on recent chest CT scan.          6.  Tobacco abuse with cigarettes in remission.    Plan:  1.  One year follow up for " 0.6 cm lung nodule and follow up for interstitial  lung disease ordered for 10/2022.  Order already placed prior to office visit today.  2.  Continue Singulair, OTC histamine, allergy and immunotherapy.      3. PFTs and six minute walk test will be due on 03/2022.  Order placed today.  4. Up-to-date with Prevnar, Pneumovax. and COVID19 vaccination.  Our office is currently out of Fluzone.  Patient is advised to receive her flu vaccine at her pharmacy or through her primary care provider.  Patient is advised to continue to follow CDC recommendations such as social distancing, wearing a mask, and washing hands for least 20 seconds.  5.  For  GERD,  patient to continue PPI.   Patient is also advised to sleep with the head of the bed elevated and do not eat 3-4 hours prior to bedtime.   6.  Patient to call the office, 911, or go to the ER with new or worsening symptoms.       7.  Follow-up with Dr. Robison 6 months, sooner if needed.          Follow Up   Return in about 6 months (around 4/25/2022) for with Dr. Robison. Patient requesting [facility] location.  Patient was given instructions and counseling regarding her condition or for health maintenance advice. Please see specific information pulled into the AVS if appropriate.

## 2021-10-15 NOTE — PATIENT INSTRUCTIONS
Pulmonary Fibrosis    Pulmonary fibrosis is a type of lung disease that causes scarring. Over time, the scar tissue builds up in the air sacs of your lungs (alveoli). This makes it hard for you to breathe. Less oxygen can get into your blood.  Scarring from pulmonary fibrosis gets worse over time. This damage is permanent and may lead to other serious health problems.  What are the causes?  There are many different causes of pulmonary fibrosis. Sometimes the cause is not known. This is called idiopathic pulmonary fibrosis. Other causes include:  · Exposure to chemicals and substances found in agricultural, farm, construction, or factory work. These include mold, asbestos, silica, metal dusts, and toxic fumes.  · Sarcoidosis. In this disease, areas of inflammatory cells (granulomas) form and most often affect the lungs.  · Autoimmune diseases. These include diseases such as rheumatoid arthritis, systemic sclerosis, or connective tissue disease.  · Taking certain medicines. These include drugs used in radiation therapy or used to treat seizures, heart problems, and some infections.  What increases the risk?  You are more likely to develop this condition if:  · You have a family history of the disease.  · You are older. The condition is more common in older adults.  · You have a history of smoking.  · You have a job that exposes you to certain chemicals.  · You have gastroesophageal reflux disease (GERD).  What are the signs or symptoms?  Symptoms of this condition include:  · Difficulty breathing that gets worse with activity.  · Shortness of breath (dyspnea).  · Dry, hacking cough.  · Rapid, shallow breathing during exercise or while at rest.  · Bluish skin and lips.  · Loss of appetite.  · Weakness.  · Weight loss and fatigue.  · Rounded and enlarged fingertips (clubbing).  How is this diagnosed?  This condition may be diagnosed based on:  · Your symptoms and medical history.  · A physical exam.  You may also have  tests, including:  · A test that involves looking inside your lungs with an instrument (bronchoscopy).  · Imaging studies of your lungs and heart.  · Tests to measure how well you are breathing (pulmonary function tests).  · Blood tests.  · Tests to see how well your lungs work while you are walking (pulmonary stress test).  · A procedure to remove a lung tissue sample to look at it under a microscope (biopsy).  How is this treated?  There is no cure for pulmonary fibrosis. Treatment focuses on managing symptoms and preventing scarring from getting worse. This may include:  · Medicines, such as:  ? Steroids to prevent permanent lung changes.  ? Medicines to suppress your body's defense system (immune system).  ? Medicines to help with lung function by reducing inflammation or scarring.  · Ongoing monitoring with X-rays and lab work.  · Oxygen therapy.  · Pulmonary rehabilitation.  · Surgery. In some cases, a lung transplant is possible.  Follow these instructions at home:         Medicines  · Take over-the-counter and prescription medicines only as told by your health care provider.  · Keep your vaccinations up to date as recommended by your health care provider.  General instructions  · Do not use any products that contain nicotine or tobacco, such as cigarettes and e-cigarettes. If you need help quitting, ask your health care provider.  · Get regular exercise, but do not overexert yourself. Ask your health care provider to suggest some activities that are safe for you to do.  ? If you have physical limitations, you may get exercise by walking, using a stationary bike, or doing chair exercises.  ? Ask your health care provider about using oxygen while exercising.  · If you are exposed to chemicals and substances at work, make sure that you wear a mask or respirator at all times.  · Join a pulmonary rehabilitation program or a support group for people with pulmonary fibrosis.  · Eat small meals often so you do not  get too full. Overeating can make breathing trouble worse.  · Maintain a healthy weight. Lose weight if you need to.  · Do breathing exercises as directed by your health care provider.  · Keep all follow-up visits as told by your health care provider. This is important.  Contact a health care provider if you:  · Have symptoms that do not get better with medicines.  · Are not able to be as active as usual.  · Have trouble taking a deep breath.  · Have a fever or chills.  · Have blue lips or skin.  · Have clubbing of your fingers.  Get help right away if you:  · Have a sudden worsening of your symptoms.  · Have chest pain.  · Cough up mucus that is dark in color.  · Have a lot of headaches.  · Get very confused or sleepy.  Summary  · Pulmonary fibrosis is a type of lung disease that causes scar tissue to build up in the air sacs of your lungs (alveoli) over time. Less oxygen can get into your blood. This makes it hard for you to breathe.  · Scarring from pulmonary fibrosis gets worse over time. This damage is permanent and may lead to other serious health problems.  · You are more likely to develop this condition if you have a family history of the condition or a job that exposes you to certain chemicals.  · There is no cure for pulmonary fibrosis. Treatment focuses on managing symptoms and preventing scarring from getting worse.  This information is not intended to replace advice given to you by your health care provider. Make sure you discuss any questions you have with your health care provider.  Document Revised: 01/23/2019 Document Reviewed: 01/23/2019  [employer] Patient Education © 2021 [employer]

## 2021-10-22 PROBLEM — I10 HYPERTENSION: Status: ACTIVE | Noted: 2021-10-22

## 2021-10-22 PROBLEM — K58.9 IBS (IRRITABLE BOWEL SYNDROME): Status: ACTIVE | Noted: 2021-10-22

## 2021-10-22 PROBLEM — E78.5 HYPERLIPEMIA: Status: ACTIVE | Noted: 2021-10-22

## 2021-10-22 PROBLEM — D64.9 ANEMIA: Status: ACTIVE | Noted: 2021-10-22

## 2021-10-22 PROBLEM — I34.1 MVP (MITRAL VALVE PROLAPSE): Status: ACTIVE | Noted: 2021-10-22

## 2021-10-22 PROBLEM — K57.30 DIVERTICULOSIS OF COLON: Status: ACTIVE | Noted: 2021-10-22

## 2021-10-22 PROBLEM — K21.9 GERD (GASTROESOPHAGEAL REFLUX DISEASE): Status: ACTIVE | Noted: 2021-10-22

## 2021-10-22 PROBLEM — T14.8XXA BROKEN BONES: Status: ACTIVE | Noted: 2021-10-22

## 2021-10-22 PROBLEM — J01.80 OTHER ACUTE SINUSITIS: Status: ACTIVE | Noted: 2021-10-22

## 2021-10-25 ENCOUNTER — OFFICE VISIT (OUTPATIENT)
Dept: PULMONOLOGY | Facility: CLINIC | Age: 66
End: 2021-10-25

## 2021-10-25 VITALS
OXYGEN SATURATION: 100 % | HEART RATE: 83 BPM | SYSTOLIC BLOOD PRESSURE: 127 MMHG | WEIGHT: 180 LBS | RESPIRATION RATE: 14 BRPM | BODY MASS INDEX: 28.93 KG/M2 | HEIGHT: 66 IN | DIASTOLIC BLOOD PRESSURE: 85 MMHG

## 2021-10-25 DIAGNOSIS — F17.201 TOBACCO ABUSE, IN REMISSION: ICD-10-CM

## 2021-10-25 DIAGNOSIS — J84.9 ILD (INTERSTITIAL LUNG DISEASE) (HCC): Primary | ICD-10-CM

## 2021-10-25 DIAGNOSIS — J30.2 SEASONAL ALLERGIES: ICD-10-CM

## 2021-10-25 DIAGNOSIS — R91.1 LUNG NODULE: ICD-10-CM

## 2021-10-25 DIAGNOSIS — K21.9 GASTROESOPHAGEAL REFLUX DISEASE, UNSPECIFIED WHETHER ESOPHAGITIS PRESENT: ICD-10-CM

## 2021-10-25 DIAGNOSIS — R05.3 CHRONIC COUGH: ICD-10-CM

## 2021-10-25 PROCEDURE — 99214 OFFICE O/P EST MOD 30 MIN: CPT | Performed by: NURSE PRACTITIONER

## 2021-10-25 RX ORDER — DICYCLOMINE HCL 20 MG
20 TABLET ORAL 4 TIMES DAILY
COMMUNITY
Start: 2021-10-04

## 2021-10-25 RX ORDER — LEVOCETIRIZINE DIHYDROCHLORIDE 5 MG/1
5 TABLET, FILM COATED ORAL
COMMUNITY
Start: 2021-10-04 | End: 2022-04-14 | Stop reason: SDUPTHER

## 2021-10-25 RX ORDER — ESTRADIOL 1 MG/1
1 TABLET ORAL DAILY
COMMUNITY
Start: 2021-10-04 | End: 2022-04-14

## 2021-10-25 RX ORDER — ATENOLOL 25 MG/1
25 TABLET ORAL DAILY
COMMUNITY
Start: 2021-10-04 | End: 2022-04-14

## 2021-10-25 RX ORDER — CYCLOBENZAPRINE HCL 10 MG
10 TABLET ORAL
COMMUNITY
Start: 2021-10-04 | End: 2022-04-14

## 2021-10-25 RX ORDER — MONTELUKAST SODIUM 10 MG/1
10 TABLET ORAL
COMMUNITY
Start: 2021-10-04 | End: 2022-04-14 | Stop reason: SDUPTHER

## 2021-10-25 RX ORDER — ROSUVASTATIN CALCIUM 10 MG/1
10 TABLET, COATED ORAL NIGHTLY
COMMUNITY

## 2021-10-25 RX ORDER — TERBINAFINE HYDROCHLORIDE 250 MG/1
250 TABLET ORAL DAILY
COMMUNITY
Start: 2021-10-04 | End: 2022-04-14

## 2021-10-25 RX ORDER — GUAIFENESIN/PHENYLEPHRINE HCL 400MG-10MG
TABLET ORAL
COMMUNITY

## 2021-10-25 RX ORDER — PANTOPRAZOLE SODIUM 40 MG/1
40 TABLET, DELAYED RELEASE ORAL
COMMUNITY
Start: 2021-10-04 | End: 2022-04-14

## 2022-03-21 ENCOUNTER — PROCEDURE VISIT (OUTPATIENT)
Dept: CARDIAC REHAB | Facility: HOSPITAL | Age: 67
End: 2022-03-21

## 2022-03-21 ENCOUNTER — HOSPITAL ENCOUNTER (OUTPATIENT)
Dept: RESPIRATORY THERAPY | Facility: HOSPITAL | Age: 67
Discharge: HOME | End: 2022-03-21

## 2022-03-21 DIAGNOSIS — J84.9 ILD (INTERSTITIAL LUNG DISEASE): ICD-10-CM

## 2022-03-21 PROCEDURE — 94060 EVALUATION OF WHEEZING: CPT | Performed by: INTERNAL MEDICINE

## 2022-03-21 PROCEDURE — 94729 DIFFUSING CAPACITY: CPT | Performed by: INTERNAL MEDICINE

## 2022-03-21 PROCEDURE — 94726 PLETHYSMOGRAPHY LUNG VOLUMES: CPT

## 2022-03-21 PROCEDURE — 94729 DIFFUSING CAPACITY: CPT

## 2022-03-21 PROCEDURE — 94618 PULMONARY STRESS TESTING: CPT

## 2022-03-21 PROCEDURE — 94726 PLETHYSMOGRAPHY LUNG VOLUMES: CPT | Performed by: INTERNAL MEDICINE

## 2022-03-21 PROCEDURE — 94060 EVALUATION OF WHEEZING: CPT

## 2022-03-21 RX ORDER — ALBUTEROL SULFATE 2.5 MG/3ML
2.5 SOLUTION RESPIRATORY (INHALATION) ONCE
Status: COMPLETED | OUTPATIENT
Start: 2022-03-21 | End: 2022-03-21

## 2022-03-21 RX ADMIN — ALBUTEROL SULFATE 2.5 MG: 2.5 SOLUTION RESPIRATORY (INHALATION) at 10:24

## 2022-04-14 ENCOUNTER — OFFICE VISIT (OUTPATIENT)
Dept: PULMONOLOGY | Facility: CLINIC | Age: 67
End: 2022-04-14

## 2022-04-14 VITALS
TEMPERATURE: 97.7 F | RESPIRATION RATE: 16 BRPM | DIASTOLIC BLOOD PRESSURE: 62 MMHG | BODY MASS INDEX: 29.25 KG/M2 | WEIGHT: 182 LBS | OXYGEN SATURATION: 100 % | HEIGHT: 66 IN | HEART RATE: 69 BPM | SYSTOLIC BLOOD PRESSURE: 126 MMHG

## 2022-04-14 DIAGNOSIS — J84.9 ILD (INTERSTITIAL LUNG DISEASE): Primary | ICD-10-CM

## 2022-04-14 DIAGNOSIS — G47.33 OSA (OBSTRUCTIVE SLEEP APNEA): ICD-10-CM

## 2022-04-14 DIAGNOSIS — I48.0 PAROXYSMAL ATRIAL FIBRILLATION: ICD-10-CM

## 2022-04-14 PROCEDURE — 99214 OFFICE O/P EST MOD 30 MIN: CPT | Performed by: INTERNAL MEDICINE

## 2022-04-14 RX ORDER — METOPROLOL SUCCINATE 50 MG/1
50 TABLET, EXTENDED RELEASE ORAL DAILY
COMMUNITY
Start: 2022-03-25 | End: 2022-11-07

## 2022-04-14 RX ORDER — CYCLOBENZAPRINE HCL 10 MG
10 TABLET ORAL 2 TIMES DAILY PRN
COMMUNITY

## 2022-04-14 RX ORDER — MONTELUKAST SODIUM 10 MG/1
10 TABLET ORAL NIGHTLY
COMMUNITY
Start: 2022-04-01

## 2022-04-14 RX ORDER — FLUTICASONE PROPIONATE 50 MCG
1 SPRAY, SUSPENSION (ML) NASAL DAILY
COMMUNITY

## 2022-04-14 RX ORDER — LEVOCETIRIZINE DIHYDROCHLORIDE 5 MG/1
5 TABLET, FILM COATED ORAL DAILY
COMMUNITY

## 2022-04-14 RX ORDER — ERGOCALCIFEROL 1.25 MG/1
50000 CAPSULE ORAL
COMMUNITY
Start: 2022-03-02 | End: 2022-06-23

## 2022-04-14 RX ORDER — OMEPRAZOLE 20 MG/1
20 CAPSULE, DELAYED RELEASE ORAL DAILY
COMMUNITY
Start: 2022-02-28 | End: 2022-11-07

## 2022-04-14 NOTE — PROGRESS NOTES
Primary Care Provider  En Blank DO     Referring Provider  No ref. provider found     Chief Complaint  Follow-up (6 month follow up- ILD) and Cough    Subjective          History of Presenting Illness  Patient is a 66-year-old  female with ILD secondary to hypersensitivity pneumonitis here for follow-up.  Since last visit she did have some increasing fatigue, chest palpitation shortness of breath and feeling poorly.  She had since been diagnosed with atrial fibrillation.  It is paroxysmal.  She is currently on Xarelto and metoprolol.  During that work-up she had a stress test done that was normal.  Sleep study did show obstructive sleep apnea and she is in the process of awaiting for a CPAP machine.  He does report snoring, daytime sleepiness and fatigue.  Denies any morning headaches.  She states her dyspnea is at baseline.  1 year follow-up PFT and 6-minute walk test showed no significant changes compared to last year.  She has a repeat chest scan October 2022 but she has stable changes consistent with her chronic ILD.  Dyspnea is moderate severity, worse with activity and relieved with rest.  She gets short of breath walking about 2000 feet or up 2 flights of steps.  Allergies are well controlled per family allergy and asthma.  She is on Xyzal and Singulair as well as allergy immunotherapy.  Patient denies dyspnea, cough, wheezing, fever, chills, night sweats, swollen glands in the head and neck, unintentional weight loss, hemoptysis, purulent sputum production, dysphagia, chest pain, palpitations, chest tightness, abdominal pain, nausea, vomiting, and diarrhea.  Patient also denies any myalgias, changes in sense of taste and/or smell, sore throat, any other coronavirus or flu-like symptoms.  Patient denies any leg swelling, orthopnea, paroxysmal nocturnal dyspnea.  Patient is able to perform activities of daily living.    Review of Systems   Constitutional: Negative for activity change, appetite  change, chills, diaphoresis, fatigue, fever, unexpected weight gain and unexpected weight loss.        Negative for Insomnia   HENT: Negative for congestion (Nasal), mouth sores, nosebleeds, postnasal drip, sore throat, swollen glands and trouble swallowing.         Negative for Thrush  Negative for Hoarseness  Negative for Allergies/Hay Fever  Negative for Recent Head injury  Negative for Ear Fullness  Negative for Nasal or Sinus pain  Negative for Dry lips  Negative for Nasal discharge   Respiratory: Positive for shortness of breath. Negative for apnea, cough, chest tightness and wheezing.         Negative for Hemoptysis  Negative for Pleuritic pain   Cardiovascular: Positive for palpitations. Negative for chest pain and leg swelling.        Negative for Claudication  Negative for Cyanosis  Negative for Dyspnea on exertion   Gastrointestinal: Negative for abdominal pain, diarrhea, nausea, vomiting and GERD.   Musculoskeletal: Negative for joint swelling and myalgias.        Negative for Joint pain  Negative for Joint stiffness   Skin: Negative for color change, dry skin, pallor and rash.   Neurological: Negative for syncope, weakness and headache.   Hematological: Negative for adenopathy. Does not bruise/bleed easily.        Family History   Problem Relation Age of Onset   • Hypertension Mother    • Cancer Father         Lung   • Cancer Maternal Grandmother         Breast   • Diabetes Brother         Social History     Socioeconomic History   • Marital status:    Tobacco Use   • Smoking status: Never Smoker   • Smokeless tobacco: Never Used   Vaping Use   • Vaping Use: Never used   Substance and Sexual Activity   • Alcohol use: Never   • Drug use: Never   • Sexual activity: Yes     Partners: Male     Birth control/protection: Post-menopausal, Surgical        Past Medical History:   Diagnosis Date   • Allergic rhinitis    • Coronary artery disease 2/19/2022    Atrial Fibrillation   • GERD (gastroesophageal  reflux disease)    • Interstitial lung disease (HCC)    • Sleep apnea, obstructive 4/4/2022    Sleep study results        Immunization History   Administered Date(s) Administered   • Fluzone High Dose =>65 Years ([employer] ONLY) 09/30/2020   • Influenza TIV (IM) 10/01/2018   • Pneumococcal Conjugate 13-Valent (PCV13) 09/30/2020   • Shingrix 02/10/2022       Allergies   Allergen Reactions   • Gemfibrozil Myalgia     Muscle/joint pain   • Nitrofurantoin Hives   • Sulfa Antibiotics Hives   • Sulfamethoxazole-Trimethoprim Hives          Current Outpatient Medications:   •  cyclobenzaprine (FLEXERIL) 10 MG tablet, Take 10 mg by mouth., Disp: , Rfl:   •  dicyclomine (BENTYL) 20 MG tablet, Take 20 mg by mouth., Disp: , Rfl:   •  ergocalciferol (ERGOCALCIFEROL) 1.25 MG (40302 UT) capsule, Take 50,000 Units by mouth., Disp: , Rfl:   •  fluticasone (FLONASE) 50 MCG/ACT nasal spray, 1 spray into the nostril(s) as directed by provider Daily., Disp: , Rfl:   •  levocetirizine (XYZAL) 5 MG tablet, Take 5 mg by mouth Daily., Disp: , Rfl:   •  metoprolol succinate XL (TOPROL-XL) 50 MG 24 hr tablet, Take 50 mg by mouth Daily., Disp: , Rfl:   •  montelukast (SINGULAIR) 10 MG tablet, Take 10 mg by mouth., Disp: , Rfl:   •  omeprazole (priLOSEC) 20 MG capsule, Take 20 mg by mouth Daily., Disp: , Rfl:   •  Phenylephrine-guaiFENesin  MG tablet, , Disp: , Rfl:   •  rivaroxaban (XARELTO) 20 MG tablet, Take 20 mg by mouth Daily., Disp: , Rfl:   •  rosuvastatin (CRESTOR) 10 MG tablet, , Disp: , Rfl:      Objective     Physical Exam  Vital Signs Reviewed  WDWN, Alert, NAD.    HEENT:  PERRL, EOMI.  OP, nares clear, no sinus tenderness  Neck:  Supple, no JVD, no thyromegaly.  Chest:  good aeration, scant inspiratory Velcro-like crackles at bases bilaterally, tympanic to percussion bilaterally, no work of breathing noted  CV: RRR, no MGR, pulses 2+, equal.  Abd:  Soft, NT, ND, + BS, no HSM  EXT:  no clubbing, no cyanosis, no edema, no joint  "tenderness  Neuro:  A&Ox3, CN grossly intact, no focal deficits.  Skin: No rashes or lesions noted.    Vital Signs:   /62 (BP Location: Right arm, Patient Position: Sitting, Cuff Size: Large Adult)   Pulse 69   Temp 97.7 °F (36.5 °C) (Tympanic)   Resp 16   Ht 167.6 cm (66\")   Wt 82.6 kg (182 lb)   SpO2 100% Comment: room air  BMI 29.38 kg/m²         Result Review :   I have personally reviewed Dr. Robison's last office visit note.  Also personally reviewed PFT and 6-minute walk test showing no significant changes compared to 2021.  No desaturations noted on 6-minute walk test.       Assessment and Plan      Assessment:  Chronic hypersensitivity pneumonitis/ILD, well-controlled with minimal symptoms.  She had a course of steroids in the past and appears to be in remission.     PFTs from 2022 and 6-minute walk test 1/20/2022 showed no significant changes   Fibrillation, paroxysmal new diagnosis.  Currently on anticoagulation and beta-blocker  Seasonal allergies well-controlled     Gastroesophageal reflux disease without esophagitis well-controlled on proton pump inhibitor.      0.6 cm lung nodule.  Stable on recent chest CT scan x1 year.          Tobacco abuse with cigarettes in remission.    Plan:  Repeat noncontrast chest CT in October 2022 to document 2-year stability of 0.6 cm lung nodule.  Continue Singulair, Xyzal, and allergy immunotherapy per allergist  Continue PPI  We will check PFT and 6-minute walk test in March 2023.  If no changes at that time, we will space these out every couple years.  Patient's ILD appears to be chronic and at baseline.  She has not had any exacerbations in a number years.  Hopefully she will stay stable for years to come  Management of A. fib per cardiology.  On metoprolol and Xarelto  Patient awaiting CPAP machine.  Up-to-date with Prevnar, Pneumovax. and COVID19 vaccination.    Follow Up   Return in about 1 year (around 4/14/2023).  Patient was given instructions and " counseling regarding her condition or for health maintenance advice. Please see specific information pulled into the AVS if appropriate.     Electronically signed by Harinder Robison MD, 04/14/22, 9:29 AM EDT.

## 2022-08-22 DIAGNOSIS — J84.9 ILD (INTERSTITIAL LUNG DISEASE): Primary | ICD-10-CM

## 2022-09-14 ENCOUNTER — OFFICE VISIT (OUTPATIENT)
Dept: SURGERY | Facility: CLINIC | Age: 67
End: 2022-09-14

## 2022-09-14 ENCOUNTER — PREP FOR SURGERY (OUTPATIENT)
Dept: OTHER | Facility: HOSPITAL | Age: 67
End: 2022-09-14

## 2022-09-14 VITALS — HEIGHT: 66 IN | WEIGHT: 182 LBS | HEART RATE: 59 BPM | BODY MASS INDEX: 29.25 KG/M2

## 2022-09-14 DIAGNOSIS — R10.13 EPIGASTRIC PAIN: Primary | ICD-10-CM

## 2022-09-14 DIAGNOSIS — R10.13 EPIGASTRIC PAIN: ICD-10-CM

## 2022-09-14 DIAGNOSIS — Z12.11 ENCOUNTER FOR SCREENING COLONOSCOPY: ICD-10-CM

## 2022-09-14 DIAGNOSIS — R11.0 NAUSEA: ICD-10-CM

## 2022-09-14 DIAGNOSIS — K21.9 GASTROESOPHAGEAL REFLUX DISEASE WITHOUT ESOPHAGITIS: ICD-10-CM

## 2022-09-14 DIAGNOSIS — K21.9 GERD WITHOUT ESOPHAGITIS: Primary | ICD-10-CM

## 2022-09-14 DIAGNOSIS — Z12.11 SCREENING FOR MALIGNANT NEOPLASM OF COLON: ICD-10-CM

## 2022-09-14 PROCEDURE — 99213 OFFICE O/P EST LOW 20 MIN: CPT | Performed by: NURSE PRACTITIONER

## 2022-09-14 RX ORDER — OMEPRAZOLE 20 MG/1
40 CAPSULE, DELAYED RELEASE ORAL DAILY
COMMUNITY
Start: 2022-07-27

## 2022-09-14 RX ORDER — CHOLECALCIFEROL (VITAMIN D3) 125 MCG
5 CAPSULE ORAL NIGHTLY
COMMUNITY
End: 2022-11-07

## 2022-09-14 RX ORDER — POLYETHYLENE GLYCOL 3350 17 G/17G
POWDER, FOR SOLUTION ORAL
Qty: 238 PACKET | Refills: 0 | Status: SHIPPED | OUTPATIENT
Start: 2022-09-14

## 2022-09-14 RX ORDER — ERGOCALCIFEROL (VITAMIN D2) 10 MCG
400 TABLET ORAL DAILY
COMMUNITY
End: 2022-11-07

## 2022-09-14 NOTE — PROGRESS NOTES
Chief Complaint: Colonoscopy (consult)    Subjective      EGD and colonoscopy consultation       History of Present Illness  Rach Ashby is a 67 y.o. female presents to [facility] for EGD and colonoscopy consultation.    Patient presents today on referral from Dr. En Blank for an EGD and colonoscopy consultation.    Patient reports that she has with heartburn and indigestion associated with some nausea.  Patient reports that she has taken omeprazole and is still with some breakthrough symptoms at night.    Admits to epigastric pain and some right upper quadrant pain.  Patient has underwent a laparoscopic cholecystectomy.    Patient reports some pain before eating, she describes this pain as burning.  Patient admits excessive belching and flatulence, admits to bloating, and feeling full before finishing meals.    She denies any lower abdominal pain, change in bowel habit, or rectal bleeding.    Denies any family history of colorectal cancer.    8/11: EGD & Colonoscopy ([facility]):hiatal hernia; bile-gatritis; severe diverticulosis; internal hemorrhoids.       Objective     Past Medical History:   Diagnosis Date   • Allergic rhinitis    • Coronary artery disease 2/19/2022    Atrial Fibrillation   • GERD (gastroesophageal reflux disease)    • Interstitial lung disease (HCC)    • Sleep apnea, obstructive 4/4/2022    Sleep study results       Past Surgical History:   Procedure Laterality Date   • BRONCHOSCOPY  6/2020       Outpatient Medications Marked as Taking for the 9/14/22 encounter (Office Visit) with Dorys Newman APRN   Medication Sig Dispense Refill   • cyclobenzaprine (FLEXERIL) 10 MG tablet Take 10 mg by mouth.     • dicyclomine (BENTYL) 20 MG tablet Take 20 mg by mouth.     • fluticasone (FLONASE) 50 MCG/ACT nasal spray 1 spray into the nostril(s) as directed by provider Daily.     • melatonin 5 MG tablet tablet Take 5 mg by mouth.     • montelukast (SINGULAIR) 10  "MG tablet Take 10 mg by mouth.     • omeprazole (priLOSEC) 20 MG capsule Take 40 mg by mouth Daily.     • Phenylephrine-guaiFENesin  MG tablet      • Probiotic Product (PROBIOTIC-10 PO) Take  by mouth.     • rivaroxaban (XARELTO) 20 MG tablet Take 20 mg by mouth Daily.     • rosuvastatin (CRESTOR) 10 MG tablet          Allergies   Allergen Reactions   • Gemfibrozil Myalgia     Muscle/joint pain   • Nitrofurantoin Hives   • Sulfa Antibiotics Hives   • Sulfamethoxazole-Trimethoprim Hives        Family History   Problem Relation Age of Onset   • Hypertension Mother    • Cancer Father         Lung   • Cancer Maternal Grandmother         Breast   • Diabetes Brother        Social History     Socioeconomic History   • Marital status:    Tobacco Use   • Smoking status: Never Smoker   • Smokeless tobacco: Never Used   Vaping Use   • Vaping Use: Never used   Substance and Sexual Activity   • Alcohol use: Never   • Drug use: Never   • Sexual activity: Yes     Partners: Male     Birth control/protection: Post-menopausal, Surgical       Review of Systems   Constitutional: Negative for chills and fever.   Gastrointestinal: Positive for abdominal pain, nausea, GERD and indigestion. Negative for abdominal distention, anal bleeding, blood in stool, constipation, diarrhea, rectal pain and vomiting.        Vital Signs:   Pulse 59   Ht 167.6 cm (66\")   Wt 82.6 kg (182 lb)   BMI 29.38 kg/m²      Physical Exam  Vitals and nursing note reviewed.   Constitutional:       General: She is not in acute distress.     Appearance: Normal appearance.   HENT:      Head: Normocephalic.   Cardiovascular:      Rate and Rhythm: Normal rate.   Pulmonary:      Effort: Pulmonary effort is normal.   Abdominal:      General: Abdomen is flat.      Palpations: Abdomen is soft.      Tenderness: There is abdominal tenderness. There is no guarding.   Musculoskeletal:         General: No deformity. Normal range of motion.   Skin:     General: Skin " is warm and dry.      Coloration: Skin is not jaundiced.   Neurological:      General: No focal deficit present.      Mental Status: She is alert and oriented to person, place, and time.   Psychiatric:         Mood and Affect: Mood normal.         Thought Content: Thought content normal.          Result Review :          []  Laboratory  []  Radiology  []  Pathology  []  Microbiology  []  EKG/Telemetry   []  Cardiology/Vascular   [x]  Old records  Today I reviewed Dr. En Blank previous office note along with previous colonoscopy done at [facility]     Assessment and Plan    Diagnoses and all orders for this visit:    1. Epigastric pain (Primary)  -     H. Pylori Antigen, Stool - Stool, Per Rectum; Future    2. Gastroesophageal reflux disease without esophagitis  -     H. Pylori Antigen, Stool - Stool, Per Rectum; Future    3. Nausea  -     H. Pylori Antigen, Stool - Stool, Per Rectum; Future    4. Screening for malignant neoplasm of colon    Other orders  -     polyethylene glycol (MIRALAX) 17 g packet; Take as directed.  Instructions given in office.  Dispense: 238 g bottle  Dispense: 238 packet; Refill: 0    I have instructed the patient to hold her Xarelto 2 days prior to the procedure.    White prep    Follow Up   Return for Scheduled EGD and colonoscopy with Dr. Ayala on 11/9/2022 at [facility], [place].     Hospital arrival time: 11:00.    Possible risks/complications, benefits, and alternatives to surgical or invasive procedure have been explained to patient and/or legal guardian.     Patient has been evaluated and can tolerate anesthesia and/or sedation. Risks, benefits, and alternatives to anesthesia and sedation have been explained to patient and/or legal guardian.  Patient verbalizes understanding and is will proceed with above plan.    Patient was given instructions and counseling regarding her condition or for health maintenance advice. Please see specific information pulled into the AVS if  appropriate.

## 2022-09-16 DIAGNOSIS — R11.0 NAUSEA: ICD-10-CM

## 2022-09-16 DIAGNOSIS — R10.13 EPIGASTRIC PAIN: ICD-10-CM

## 2022-09-16 DIAGNOSIS — K21.9 GASTROESOPHAGEAL REFLUX DISEASE WITHOUT ESOPHAGITIS: ICD-10-CM

## 2022-10-03 ENCOUNTER — TELEPHONE (OUTPATIENT)
Dept: SURGERY | Facility: CLINIC | Age: 67
End: 2022-10-03

## 2022-10-03 NOTE — TELEPHONE ENCOUNTER
We received feedback from Dr. Levy giving permission to stop Xarelto 2 days prior to her upcoming colonoscopy/egd. That office note has been scanned into patients chart.     I called to let pt know we got feedback. She V/U.

## 2022-10-06 ENCOUNTER — APPOINTMENT (OUTPATIENT)
Dept: CT IMAGING | Facility: HOSPITAL | Age: 67
End: 2022-10-06

## 2022-10-11 ENCOUNTER — APPOINTMENT (OUTPATIENT)
Dept: CT IMAGING | Facility: HOSPITAL | Age: 67
End: 2022-10-11

## 2022-10-12 ENCOUNTER — HOSPITAL ENCOUNTER (OUTPATIENT)
Dept: CT IMAGING | Facility: HOSPITAL | Age: 67
Discharge: HOME | End: 2022-10-12
Admitting: NURSE PRACTITIONER

## 2022-10-12 DIAGNOSIS — J84.9 ILD (INTERSTITIAL LUNG DISEASE): ICD-10-CM

## 2022-10-12 PROCEDURE — 71250 CT THORAX DX C-: CPT

## 2022-10-13 DIAGNOSIS — R91.1 LUNG NODULE: ICD-10-CM

## 2022-10-13 DIAGNOSIS — J84.9 ILD (INTERSTITIAL LUNG DISEASE): Primary | ICD-10-CM

## 2022-11-07 RX ORDER — CHOLECALCIFEROL (VITAMIN D3) 50 MCG
2000 TABLET ORAL DAILY
COMMUNITY

## 2022-11-07 RX ORDER — LANOLIN ALCOHOL/MO/W.PET/CERES
3 CREAM (GRAM) TOPICAL NIGHTLY
COMMUNITY

## 2022-11-09 ENCOUNTER — ANESTHESIA (OUTPATIENT)
Dept: GASTROENTEROLOGY | Facility: HOSPITAL | Age: 67
End: 2022-11-09

## 2022-11-09 ENCOUNTER — ANESTHESIA EVENT (OUTPATIENT)
Dept: GASTROENTEROLOGY | Facility: HOSPITAL | Age: 67
End: 2022-11-09

## 2022-11-09 ENCOUNTER — HOSPITAL ENCOUNTER (OUTPATIENT)
Facility: HOSPITAL | Age: 67
Setting detail: HOSPITAL OUTPATIENT SURGERY
Discharge: HOME | End: 2022-11-09
Attending: SURGERY | Admitting: SURGERY

## 2022-11-09 VITALS
TEMPERATURE: 97.1 F | OXYGEN SATURATION: 100 % | SYSTOLIC BLOOD PRESSURE: 141 MMHG | RESPIRATION RATE: 16 BRPM | BODY MASS INDEX: 28.42 KG/M2 | DIASTOLIC BLOOD PRESSURE: 97 MMHG | WEIGHT: 176.81 LBS | HEIGHT: 66 IN | HEART RATE: 78 BPM

## 2022-11-09 DIAGNOSIS — K21.9 GERD WITHOUT ESOPHAGITIS: ICD-10-CM

## 2022-11-09 DIAGNOSIS — R10.13 EPIGASTRIC PAIN: ICD-10-CM

## 2022-11-09 DIAGNOSIS — Z12.11 ENCOUNTER FOR SCREENING COLONOSCOPY: ICD-10-CM

## 2022-11-09 PROCEDURE — 25010000002 PROPOFOL 10 MG/ML EMULSION: Performed by: NURSE ANESTHETIST, CERTIFIED REGISTERED

## 2022-11-09 PROCEDURE — 88305 TISSUE EXAM BY PATHOLOGIST: CPT | Performed by: SURGERY

## 2022-11-09 RX ORDER — PROPOFOL 10 MG/ML
VIAL (ML) INTRAVENOUS AS NEEDED
Status: DISCONTINUED | OUTPATIENT
Start: 2022-11-09 | End: 2022-11-09 | Stop reason: SURG

## 2022-11-09 RX ORDER — LIDOCAINE HYDROCHLORIDE 20 MG/ML
INJECTION, SOLUTION EPIDURAL; INFILTRATION; INTRACAUDAL; PERINEURAL AS NEEDED
Status: DISCONTINUED | OUTPATIENT
Start: 2022-11-09 | End: 2022-11-09 | Stop reason: SURG

## 2022-11-09 RX ORDER — SODIUM CHLORIDE, SODIUM LACTATE, POTASSIUM CHLORIDE, CALCIUM CHLORIDE 600; 310; 30; 20 MG/100ML; MG/100ML; MG/100ML; MG/100ML
30 INJECTION, SOLUTION INTRAVENOUS CONTINUOUS
Status: DISCONTINUED | OUTPATIENT
Start: 2022-11-09 | End: 2022-11-09 | Stop reason: HOSPADM

## 2022-11-09 RX ADMIN — LIDOCAINE HYDROCHLORIDE 100 MG: 20 INJECTION, SOLUTION EPIDURAL; INFILTRATION; INTRACAUDAL; PERINEURAL at 12:23

## 2022-11-09 RX ADMIN — SODIUM CHLORIDE, POTASSIUM CHLORIDE, SODIUM LACTATE AND CALCIUM CHLORIDE: 600; 310; 30; 20 INJECTION, SOLUTION INTRAVENOUS at 12:21

## 2022-11-09 RX ADMIN — PROPOFOL 200 MCG/KG/MIN: 10 INJECTION, EMULSION INTRAVENOUS at 12:23

## 2022-11-09 RX ADMIN — PROPOFOL 100 MG: 10 INJECTION, EMULSION INTRAVENOUS at 12:23

## 2022-11-09 NOTE — H&P
Chief Complaint: Colonoscopy (consult)    Subjective      EGD and colonoscopy consultation       History of Present Illness  Rach Ashby is a 67 y.o. female presents to [facility] for EGD and colonoscopy consultation.    Patient referred by Dr. En Blank for an EGD and colonoscopy consultation.    Patient reports that she has with heartburn and indigestion associated with some nausea.  Patient reports that she has taken omeprazole and is still with some breakthrough symptoms at night.    Admits to epigastric pain and some right upper quadrant pain.  Patient has underwent a laparoscopic cholecystectomy.    Patient reports some pain before eating, she describes this pain as burning.  Patient admits excessive belching and flatulence, admits to bloating, and feeling full before finishing meals.    She denies any lower abdominal pain, change in bowel habit, or rectal bleeding.    Denies any family history of colorectal cancer.    [facility]hi[facility]; bile-gatritis; severe diverticulosis; internal hemorrhoids.       Objective     Past Medical History:   Diagnosis Date   • Allergic rhinitis    • Coronary artery disease 2/19/2022    Atrial Fibrillation   • GERD (gastroesophageal reflux disease)    • Interstitial lung disease (HCC)    • Sleep apnea, obstructive 4/4/2022    Sleep study results       Past Surgical History:   Procedure Laterality Date   • BRONCHOSCOPY  6/2020       Outpatient Medications Marked as Taking for the 9/14/22 encounter (Office Visit) with Dorys Newman APRN   Medication Sig Dispense Refill   • cyclobenzaprine (FLEXERIL) 10 MG tablet Take 10 mg by mouth.     • dicyclomine (BENTYL) 20 MG tablet Take 20 mg by mouth.     • fluticasone (FLONASE) 50 MCG/ACT nasal spray 1 spray into the nostril(s) as directed by provider Daily.     • melatonin 5 MG tablet tablet Take 5 mg by mouth.     • montelukast (SINGULAIR) 10 MG tablet Take 10  "mg by mouth.     • omeprazole (priLOSEC) 20 MG capsule Take 40 mg by mouth Daily.     • Phenylephrine-guaiFENesin  MG tablet      • Probiotic Product (PROBIOTIC-10 PO) Take  by mouth.     • rivaroxaban (XARELTO) 20 MG tablet Take 20 mg by mouth Daily.     • rosuvastatin (CRESTOR) 10 MG tablet          Allergies   Allergen Reactions   • Gemfibrozil Myalgia     Muscle/joint pain   • Nitrofurantoin Hives   • Sulfa Antibiotics Hives   • Sulfamethoxazole-Trimethoprim Hives        Family History   Problem Relation Age of Onset   • Hypertension Mother    • Cancer Father         Lung   • Cancer Maternal Grandmother         Breast   • Diabetes Brother        Social History     Socioeconomic History   • Marital status:    Tobacco Use   • Smoking status: Never Smoker   • Smokeless tobacco: Never Used   Vaping Use   • Vaping Use: Never used   Substance and Sexual Activity   • Alcohol use: Never   • Drug use: Never   • Sexual activity: Yes     Partners: Male     Birth control/protection: Post-menopausal, Surgical       Vital Signs:   Pulse 59   Ht 167.6 cm (66\")   Wt 82.6 kg (182 lb)   BMI 29.38 kg/m²      Physical Exam  Vitals and nursing note reviewed.   Constitutional:       General: She is not in acute distress.     Appearance: Normal appearance.   HENT:      Head: Normocephalic.   Cardiovascular:      Rate and Rhythm: Normal rate.   Pulmonary:      Effort: Pulmonary effort is normal.   Neurological:      General: No focal deficit present.      Mental Status: She is alert and oriented to person, place, and time.   Psychiatric:         Mood and Affect: Mood normal.         Thought Content: Thought content normal.     Assessment   1. Epigastric pain  2. Gastroesophageal reflux disease without esophagitis  3. Nausea  4. Screening for malignant neoplasm of colon    Plan  Colonoscopy  Esophagogastroduodenoscopy    Risks and benefits discussed    Dylan Ayala M.D.  11/09/22    Electronically signed by Dylan HAGER " MD Jamie, 11/09/22, 11:16 AM EST.

## 2022-11-09 NOTE — ANESTHESIA POSTPROCEDURE EVALUATION
Patient: Rach Ashby    Procedure Summary     Date: 11/09/22 Room / Location: [facility] ENDOSCOPY 3 / [facility] ENDOSCOPY    Anesthesia Start: 1221 Anesthesia Stop: 1256    Procedures:       COLONOSCOPY      ESOPHAGOGASTRODUODENOSCOPY with biopsies Diagnosis:       GERD without esophagitis      Epigastric pain      Encounter for screening colonoscopy      (GERD without esophagitis [K21.9])      (Epigastric pain [R10.13])      (Encounter for screening colonoscopy [Z12.11])    Surgeons: Dylan Ayala MD Provider: Brenton Scherer MD    Anesthesia Type: general ASA Status: 3          Anesthesia Type: general    Vitals  Vitals Value Taken Time   /97 11/09/22 1311   Temp 36.2 °C (97.1 °F) 11/09/22 1310   Pulse 73 11/09/22 1313   Resp 16 11/09/22 1310   SpO2 100 % 11/09/22 1313   Vitals shown include unvalidated device data.        Post Anesthesia Care and Evaluation    Patient location during evaluation: bedside  Patient participation: complete - patient participated  Level of consciousness: awake  Pain management: adequate    Airway patency: patent  Anesthetic complications: No anesthetic complications  PONV Status: none  Cardiovascular status: acceptable and stable  Respiratory status: acceptable  Hydration status: acceptable    Comments: An Anesthesiologist personally participated in the most demanding procedures (including induction and emergence if applicable) in the anesthesia plan, monitored the course of anesthesia administration at frequent intervals and remained physically present and available for immediate diagnosis and treatment of emergencies.

## 2022-11-09 NOTE — ANESTHESIA PREPROCEDURE EVALUATION
Anesthesia Evaluation     Patient summary reviewed and Nursing notes reviewed   no history of anesthetic complications:  NPO Solid Status: > 8 hours  NPO Liquid Status: > 2 hours           Airway   Mallampati: II  TM distance: >3 FB  Neck ROM: full  No difficulty expected  Dental      Pulmonary - normal exam    breath sounds clear to auscultation  (+) sleep apnea,   Cardiovascular - normal exam  Exercise tolerance: good (4-7 METS)    Rhythm: regular  Rate: normal    (+) hypertension, valvular problems/murmurs MVP, CAD, dysrhythmias Paroxysmal Atrial Fib, hyperlipidemia,       Neuro/Psych- negative ROS  GI/Hepatic/Renal/Endo    (+)  GERD,      Musculoskeletal     Abdominal    Substance History      OB/GYN          Other        ROS/Med Hx Other: PAT Nursing Notes unavailable.                   Anesthesia Plan    ASA 3     general   total IV anesthesia    Anesthetic plan, risks, benefits, and alternatives have been provided, discussed and informed consent has been obtained with: patient.        CODE STATUS:

## 2022-11-09 NOTE — NURSING NOTE
IV inserted and maintained.  Patient educated on procedure, discharge criteria, and discharge instructions.  Consent explained, signed, and witness signature provided.  Warm blanket, low lights offered.  Will continue to update patient on procedure time.    Ebony Miller RN 12:08 EST 11/9/2022

## 2022-11-11 LAB
CYTO UR: NORMAL
LAB AP CASE REPORT: NORMAL
LAB AP CLINICAL INFORMATION: NORMAL
PATH REPORT.FINAL DX SPEC: NORMAL
PATH REPORT.GROSS SPEC: NORMAL

## 2023-03-24 ENCOUNTER — HOSPITAL ENCOUNTER (OUTPATIENT)
Dept: RESPIRATORY THERAPY | Facility: HOSPITAL | Age: 68
Discharge: HOME | End: 2023-03-24
Payer: MEDICARE

## 2023-03-24 ENCOUNTER — PROCEDURE VISIT (OUTPATIENT)
Dept: CARDIAC REHAB | Facility: HOSPITAL | Age: 68
End: 2023-03-24
Payer: MEDICARE

## 2023-03-24 DIAGNOSIS — J84.9 ILD (INTERSTITIAL LUNG DISEASE): ICD-10-CM

## 2023-03-24 PROCEDURE — 94729 DIFFUSING CAPACITY: CPT | Performed by: STUDENT IN AN ORGANIZED HEALTH CARE EDUCATION/TRAINING PROGRAM

## 2023-03-24 PROCEDURE — 94060 EVALUATION OF WHEEZING: CPT

## 2023-03-24 PROCEDURE — 94726 PLETHYSMOGRAPHY LUNG VOLUMES: CPT

## 2023-03-24 PROCEDURE — 94060 EVALUATION OF WHEEZING: CPT | Performed by: STUDENT IN AN ORGANIZED HEALTH CARE EDUCATION/TRAINING PROGRAM

## 2023-03-24 PROCEDURE — 94729 DIFFUSING CAPACITY: CPT

## 2023-03-24 PROCEDURE — 94726 PLETHYSMOGRAPHY LUNG VOLUMES: CPT | Performed by: STUDENT IN AN ORGANIZED HEALTH CARE EDUCATION/TRAINING PROGRAM

## 2023-03-24 PROCEDURE — 94618 PULMONARY STRESS TESTING: CPT

## 2023-03-24 RX ORDER — ALBUTEROL SULFATE 2.5 MG/3ML
2.5 SOLUTION RESPIRATORY (INHALATION) ONCE
Status: COMPLETED | OUTPATIENT
Start: 2023-03-24 | End: 2023-03-24

## 2023-03-24 RX ADMIN — ALBUTEROL SULFATE 2.5 MG: 2.5 SOLUTION RESPIRATORY (INHALATION) at 14:27

## 2023-04-14 NOTE — PROGRESS NOTES
Primary Care Provider  En Blank DO   Referring Provider  No ref. provider found      Patient Complaint  Lung Nodule, Sleep Apnea, interstitial lung disease, Cough (Since Feb 2023 comes and goes), Wheezing (Since feb 2023 comes and goes), and Follow-up      Subjective          Rach Ashby presents to [facility]      History of Presenting Illness  Rach Ashby is a 67 y.o. female patient of Dr. Robison with history of hypersensitivity pneumonitis, ILD, paroxysmal atrial fibrillation, lung nodule, WALESKA, seasonal allergies, here for 1 year follow-up.    Patient states she has doing well since her last visit.  She did test positive for COVID last month but seems to have fully recovered.  Patient denies using any antibiotics or steroids for her lungs, denies any fevers or chills.  She has been diagnosed with chronic hypersensitivity pneumonitis/ILD in the past, her symptoms are well controlled, she does not complain of any shortness of breath.  Patient does report that she has had a cough productive of white sputum and intermittent wheezing for the past couple of months.  Most recent PFTs done March 2023 show stable disease, no significant changes compared to prior testing.  Patient does not currently use any inhalers.  Patient uses CPAP nightly and with naps.  She takes Xarelto for paroxysmal atrial fibrillation, follows up with cardiology for management.  Patient takes Singulair, Xyzal, and sees allergy specialist for seasonal allergies.  Previously seen 0.6 cm lung nodule reached 2-year stability in October 2022.  Patient is a never smoker.  She denies any productive cough, hemoptysis, swollen lymph nodes, weight loss, or night sweats.  Overall, patient is doing well and has no additional concerns at this time.  Patient is able to perform ADLs without difficulty.  I have personally reviewed the review of systems, past family, social, medical and  surgical histories; and agree with their findings.      Review of Systems    Review of Systems   Constitutional: Negative for activity change, chills, fatigue, fever, unexpected weight gain and unexpected weight loss.   HENT: Negative for congestion, ear discharge, ear pain, mouth sores, postnasal drip, rhinorrhea, sinus pressure, sore throat, swollen glands and trouble swallowing.    Eyes: Negative for blurred vision, pain, discharge, itching and visual disturbance.   Respiratory: Positive for cough and wheezing. Negative for apnea, chest tightness, shortness of breath and stridor.    Cardiovascular: Negative for chest pain, palpitations and leg swelling.   Gastrointestinal: Negative for abdominal distention, abdominal pain, constipation, diarrhea, nausea, vomiting, GERD and indigestion.   Musculoskeletal: Negative for arthralgias, joint swelling and myalgias.   Skin: Negative for color change.   Neurological: Negative for dizziness, weakness, light-headedness and headache.      Sleep: Negative for Excessive daytime sleepiness  Negative for morning headaches  Negative for Snoring      Family History   Problem Relation Age of Onset   • Hypertension Mother    • Cancer Father         Lung   • Diabetes Brother    • Cancer Maternal Grandmother         Breast   • Malig Hyperthermia Neg Hx         Social History     Socioeconomic History   • Marital status:    Tobacco Use   • Smoking status: Never   • Smokeless tobacco: Never   Vaping Use   • Vaping Use: Never used   Substance and Sexual Activity   • Alcohol use: Never   • Drug use: Never   • Sexual activity: Yes     Partners: Male     Birth control/protection: Post-menopausal, Surgical        Past Medical History:   Diagnosis Date   • A-fib    • Allergic rhinitis    • Coronary artery disease 2/19/2022    Atrial Fibrillation   • COVID 03/05/2023   • GERD (gastroesophageal reflux disease)    • Interstitial lung disease    • Sleep apnea, obstructive 4/4/2022    Sleep  study results        Immunization History   Administered Date(s) Administered   • COVID-19 (MODERNA) 1st, 2nd, 3rd Dose Only 12/28/2020, 01/26/2021   • COVID-19 (MODERNA) BOOSTER 11/04/2021, 05/23/2022   • Fluzone High Dose =>65 Years ([employer] ONLY) 09/30/2020   • Influenza TIV (IM) 10/01/2018   • Pneumococcal Conjugate 13-Valent (PCV13) 09/30/2020   • Pneumococcal Conjugate 20-Valent (PCV20) 04/17/2023   • Shingrix 02/10/2022, 06/13/2022       Allergies   Allergen Reactions   • Gemfibrozil Myalgia     Muscle/joint pain   • Nitrofurantoin Hives   • Sulfa Antibiotics Hives          Current Outpatient Medications:   •  acetaminophen (TYLENOL) 650 MG 8 hr tablet, , Disp: , Rfl:   •  Cholecalciferol (Vitamin D) 50 MCG (2000 UT) tablet, Take 2,000 Units by mouth Daily., Disp: , Rfl:   •  cyclobenzaprine (FLEXERIL) 10 MG tablet, Take 1 tablet by mouth 2 (Two) Times a Day As Needed., Disp: , Rfl:   •  dicyclomine (BENTYL) 20 MG tablet, Take 1 tablet by mouth 4 (Four) Times a Day., Disp: , Rfl:   •  fluticasone (FLONASE) 50 MCG/ACT nasal spray, 1 spray into the nostril(s) as directed by provider Daily., Disp: , Rfl:   •  melatonin 3 MG tablet, Take 1 tablet by mouth Daily., Disp: , Rfl:   •  montelukast (SINGULAIR) 10 MG tablet, Take 1 tablet by mouth Every Night., Disp: , Rfl:   •  omeprazole (priLOSEC) 20 MG capsule, Take 2 capsules by mouth Daily. 2 tabs, Disp: , Rfl:   •  Phenylephrine-guaiFENesin  MG tablet, , Disp: , Rfl:   •  rivaroxaban (XARELTO) 20 MG tablet, Take 1 tablet by mouth Daily., Disp: , Rfl:   •  rosuvastatin (CRESTOR) 10 MG tablet, Take 1 tablet by mouth Every Night., Disp: , Rfl:   •  albuterol sulfate  (90 Base) MCG/ACT inhaler, Inhale 2 puffs Every 4 (Four) Hours As Needed for Wheezing., Disp: 18 g, Rfl: 5  •  benzonatate (TESSALON) 100 MG capsule, Take 1 capsule by mouth 3 (Three) Times a Day As Needed for Cough., Disp: 42 capsule, Rfl: 0  •  levocetirizine (XYZAL) 5 MG tablet, Take 5  "mg by mouth Daily., Disp: , Rfl:   •  metoprolol succinate XL (TOPROL-XL) 50 MG 24 hr tablet, Take 50 mg by mouth Daily., Disp: , Rfl:   •  polyethylene glycol (MIRALAX) 17 g packet, Take as directed.  Instructions given in office.  Dispense: 238 g bottle, Disp: 238 packet, Rfl: 0  •  Probiotic Product (PROBIOTIC-10 PO), Take  by mouth Daily., Disp: , Rfl:      Objective     Vital Signs:   /61 (BP Location: Left arm, Patient Position: Sitting, Cuff Size: Large Adult)   Pulse 75   Temp 98.2 °F (36.8 °C) (Tympanic)   Resp 18   Ht 167.6 cm (66\")   Wt 89.8 kg (198 lb)   SpO2 97% Comment: ROOM AIR  BMI 31.96 kg/m²     Objective   Physical Exam  Constitutional:       General: She is not in acute distress.     Appearance: Normal appearance. She is not ill-appearing.   HENT:      Right Ear: Tympanic membrane and ear canal normal.      Left Ear: Tympanic membrane and ear canal normal.      Nose: Nose normal.      Mouth/Throat:      Mouth: Mucous membranes are moist.      Pharynx: Oropharynx is clear.   Eyes:      Extraocular Movements: Extraocular movements intact.      Conjunctiva/sclera: Conjunctivae normal.      Pupils: Pupils are equal, round, and reactive to light.   Cardiovascular:      Rate and Rhythm: Normal rate and regular rhythm.      Pulses: Normal pulses.      Heart sounds: Normal heart sounds.   Pulmonary:      Effort: Pulmonary effort is normal. No respiratory distress.      Breath sounds: Normal breath sounds. No stridor. No wheezing, rhonchi or rales.   Abdominal:      General: Bowel sounds are normal.      Palpations: Abdomen is soft.   Musculoskeletal:         General: No swelling. Normal range of motion.      Cervical back: Normal range of motion and neck supple.      Right lower leg: No edema.      Left lower leg: No edema.   Skin:     General: Skin is warm and dry.   Neurological:      General: No focal deficit present.      Mental Status: She is alert and oriented to person, place, and " time.      Motor: No weakness.   Psychiatric:         Mood and Affect: Mood normal.         Behavior: Behavior normal.       Result Review :   I have personally reviewed patient's labs and images, including CT chest without contrast 10/12/2022.  I also reviewed Dr. Robison's last office note 4/14/2022.            Diagnoses and all orders for this visit:    1. ILD (interstitial lung disease) (Primary)  -     6 Minute Walk Test; Future  -     Full Pulmonary Function Test With Bronchodilator; Future  -     albuterol sulfate  (90 Base) MCG/ACT inhaler; Inhale 2 puffs Every 4 (Four) Hours As Needed for Wheezing.  Dispense: 18 g; Refill: 5    2. Hypersensitivity pneumonitis  -     6 Minute Walk Test; Future  -     Full Pulmonary Function Test With Bronchodilator; Future    3. Lung nodule    4. WALESKA (obstructive sleep apnea)    5. Paroxysmal atrial fibrillation    6. Chronic cough  -     benzonatate (TESSALON) 100 MG capsule; Take 1 capsule by mouth 3 (Three) Times a Day As Needed for Cough.  Dispense: 42 capsule; Refill: 0    7. Seasonal allergies    8. Obesity (BMI 30-39.9)    9. Encounter for immunization  -     Pneumococcal Conjugate Vaccine 20-Valent (PCV20)       Impression and Plan    -PFT 3/24/2023 showed normal spirometry, no significant response to bronchodilator, lung volumes consistent with mild restrictive lung disease, moderate reduction in DLCO 58% of predicted  -CT chest without contrast 10/12/2022 showed no interval change in pulmonary interstitial fibrosis, annual CT ordered for October 2023  -Continue with annual PFTs and 6-minute walk tests to monitor for progression of hypersensitivity pneumonitis/ILD, will order for March 2024  -6 mm left lower lobe nodule reached 2-year stability in October 2022  -Albuterol rescue inhaler sent to patient's pharmacy, patient instructed to use as needed especially if she notices increased wheezing  -Tessalon Perles sent to patient's pharmacy, patient instructed  to take as needed for cough  -Continue taking Singulair, Xyzal, and seeing allergy specialist for management of allergies  -Continue following up with cardiology as scheduled for management of atrial fibrillation on Xarelto  -Spent 5 minutes counseling patient on diet and exercise.  Recommended a low-fat, low-calorie diet.  Also recommend 30 minutes of daily exercise.  Patient verbalizes understanding.  -Follow-up with Dr. Robison or Shea in 1 year for review of annual PFT/6MWT, may return sooner if needed    Smoking status: Reviewed  Vaccination status: Patient reports she is up-to-date with her flu and Covid vaccines, has received 1 pneumonia vaccine, due for another.  We will administer PCV 20 in clinic today.  Patient is advised to continue to follow CDC recommendations such as social distancing wearing a mask and washing hands for at least 20 seconds.  Medications personally reviewed    Follow Up   Return in about 1 year (around 4/17/2024).  Patient was given instructions and counseling regarding her condition or for health maintenance advice. Please see specific information pulled into the AVS if appropriate.

## 2023-04-17 ENCOUNTER — OFFICE VISIT (OUTPATIENT)
Dept: PULMONOLOGY | Facility: CLINIC | Age: 68
End: 2023-04-17
Payer: MEDICARE

## 2023-04-17 VITALS
OXYGEN SATURATION: 97 % | WEIGHT: 198 LBS | TEMPERATURE: 98.2 F | HEIGHT: 66 IN | DIASTOLIC BLOOD PRESSURE: 61 MMHG | BODY MASS INDEX: 31.82 KG/M2 | SYSTOLIC BLOOD PRESSURE: 118 MMHG | RESPIRATION RATE: 18 BRPM | HEART RATE: 75 BPM

## 2023-04-17 DIAGNOSIS — R91.1 LUNG NODULE: ICD-10-CM

## 2023-04-17 DIAGNOSIS — J67.9 HYPERSENSITIVITY PNEUMONITIS: ICD-10-CM

## 2023-04-17 DIAGNOSIS — R05.3 CHRONIC COUGH: ICD-10-CM

## 2023-04-17 DIAGNOSIS — J84.9 ILD (INTERSTITIAL LUNG DISEASE): Primary | ICD-10-CM

## 2023-04-17 DIAGNOSIS — I48.0 PAROXYSMAL ATRIAL FIBRILLATION: ICD-10-CM

## 2023-04-17 DIAGNOSIS — J30.2 SEASONAL ALLERGIES: ICD-10-CM

## 2023-04-17 DIAGNOSIS — G47.33 OSA (OBSTRUCTIVE SLEEP APNEA): ICD-10-CM

## 2023-04-17 DIAGNOSIS — E66.9 OBESITY (BMI 30-39.9): ICD-10-CM

## 2023-04-17 DIAGNOSIS — Z23 ENCOUNTER FOR IMMUNIZATION: ICD-10-CM

## 2023-04-17 PROCEDURE — 1160F RVW MEDS BY RX/DR IN RCRD: CPT

## 2023-04-17 PROCEDURE — 99214 OFFICE O/P EST MOD 30 MIN: CPT

## 2023-04-17 PROCEDURE — 90677 PCV20 VACCINE IM: CPT

## 2023-04-17 PROCEDURE — 3078F DIAST BP <80 MM HG: CPT

## 2023-04-17 PROCEDURE — 3074F SYST BP LT 130 MM HG: CPT

## 2023-04-17 PROCEDURE — G0009 ADMIN PNEUMOCOCCAL VACCINE: HCPCS

## 2023-04-17 PROCEDURE — 1159F MED LIST DOCD IN RCRD: CPT

## 2023-04-17 RX ORDER — ALBUTEROL SULFATE 90 UG/1
2 AEROSOL, METERED RESPIRATORY (INHALATION) EVERY 4 HOURS PRN
Qty: 18 G | Refills: 5 | Status: SHIPPED | OUTPATIENT
Start: 2023-04-17

## 2023-04-17 RX ORDER — LANOLIN ALCOHOL/MO/W.PET/CERES
3 CREAM (GRAM) TOPICAL DAILY
COMMUNITY

## 2023-04-17 RX ORDER — CYCLOBENZAPRINE HCL 10 MG
10 TABLET ORAL
COMMUNITY
Start: 2023-01-20 | End: 2023-04-17 | Stop reason: SDUPTHER

## 2023-04-17 RX ORDER — DICYCLOMINE HCL 20 MG
20 TABLET ORAL
COMMUNITY
Start: 2023-01-20 | End: 2023-04-17 | Stop reason: SDUPTHER

## 2023-04-17 RX ORDER — SENNOSIDES 8.6 MG
CAPSULE ORAL
COMMUNITY

## 2023-04-17 RX ORDER — BENZONATATE 100 MG/1
100 CAPSULE ORAL 3 TIMES DAILY PRN
Qty: 42 CAPSULE | Refills: 0 | Status: SHIPPED | OUTPATIENT
Start: 2023-04-17

## 2023-10-09 ENCOUNTER — HOSPITAL ENCOUNTER (OUTPATIENT)
Dept: CT IMAGING | Facility: HOSPITAL | Age: 68
Discharge: HOME | End: 2023-10-09
Admitting: NURSE PRACTITIONER
Payer: MEDICARE

## 2023-10-09 ENCOUNTER — TELEPHONE (OUTPATIENT)
Dept: PULMONOLOGY | Facility: CLINIC | Age: 68
End: 2023-10-09
Payer: MEDICARE

## 2023-10-09 DIAGNOSIS — J84.9 ILD (INTERSTITIAL LUNG DISEASE): ICD-10-CM

## 2023-10-09 DIAGNOSIS — J84.9 ILD (INTERSTITIAL LUNG DISEASE): Primary | ICD-10-CM

## 2023-10-09 DIAGNOSIS — R91.1 LUNG NODULE: ICD-10-CM

## 2023-10-09 PROCEDURE — 71250 CT THORAX DX C-: CPT

## 2023-10-09 NOTE — TELEPHONE ENCOUNTER
"Relay     \"Chest CT scan showing stable lung nodule and stable pulmonary fibrosis. \"                 "

## 2023-10-09 NOTE — TELEPHONE ENCOUNTER
----- Message from GEORGI Mtz sent at 10/9/2023  3:55 PM EDT -----  Chest CT scan showing stable lung nodule and stable pulmonary fibrosis.

## 2024-03-14 ENCOUNTER — HOSPITAL ENCOUNTER (OUTPATIENT)
Dept: RESPIRATORY THERAPY | Facility: HOSPITAL | Age: 69
Discharge: HOME | End: 2024-03-14
Payer: MEDICARE

## 2024-03-14 DIAGNOSIS — J84.9 ILD (INTERSTITIAL LUNG DISEASE): ICD-10-CM

## 2024-03-14 DIAGNOSIS — J67.9 HYPERSENSITIVITY PNEUMONITIS: ICD-10-CM

## 2024-03-14 PROCEDURE — 94729 DIFFUSING CAPACITY: CPT

## 2024-03-14 PROCEDURE — 94060 EVALUATION OF WHEEZING: CPT

## 2024-03-14 PROCEDURE — 94726 PLETHYSMOGRAPHY LUNG VOLUMES: CPT

## 2024-03-14 PROCEDURE — 94618 PULMONARY STRESS TESTING: CPT

## 2024-03-14 RX ORDER — ALBUTEROL SULFATE 2.5 MG/3ML
2.5 SOLUTION RESPIRATORY (INHALATION) ONCE
Status: COMPLETED | OUTPATIENT
Start: 2024-03-14 | End: 2024-03-14

## 2024-03-14 RX ADMIN — ALBUTEROL SULFATE 2.5 MG: 2.5 SOLUTION RESPIRATORY (INHALATION) at 08:03

## 2024-03-14 NOTE — PROCEDURES
Exercise Oximetry    Patient Name:Rach Ashby   MRN: [id number]   Date: 03/14/24             ROOM AIR BASELINE   SpO2% 100   Heart Rate 88   Blood Pressure 140/82     EXERCISE ON ROOM AIR SpO2% EXERCISE ON O2 @  LPM SpO2%   1 MINUTE 97 1 MINUTE    2 MINUTES 93 2 MINUTES    3 MINUTES 92 3 MINUTES    4 MINUTES 92 4 MINUTES    5 MINUTES 94 5 MINUTES    6 MINUTES 95 6 MINUTES               Distance Walked  1400 ft Distance Walked   Dyspnea (Yara Scale)  1 Dyspnea (Yara Scale)   Fatigue (Yara Scale)  1 Fatigue (Yara Scale)   SpO2% Post Exercise  96 SpO2% Post Exercise   HR Post Exercise  98 HR Post Exercise   Time to Recovery   Time to Recovery     Comments: Patient walked independently with minimal complaint of dyspnea

## 2024-05-07 ENCOUNTER — OFFICE VISIT (OUTPATIENT)
Dept: PULMONOLOGY | Facility: CLINIC | Age: 69
End: 2024-05-07
Payer: MEDICARE

## 2024-05-07 VITALS
TEMPERATURE: 97.4 F | HEIGHT: 66 IN | RESPIRATION RATE: 16 BRPM | DIASTOLIC BLOOD PRESSURE: 75 MMHG | OXYGEN SATURATION: 99 % | WEIGHT: 193.6 LBS | BODY MASS INDEX: 31.12 KG/M2 | SYSTOLIC BLOOD PRESSURE: 146 MMHG | HEART RATE: 69 BPM

## 2024-05-07 DIAGNOSIS — F17.201 TOBACCO ABUSE, IN REMISSION: ICD-10-CM

## 2024-05-07 DIAGNOSIS — R05.3 CHRONIC COUGH: ICD-10-CM

## 2024-05-07 DIAGNOSIS — G47.33 OSA (OBSTRUCTIVE SLEEP APNEA): ICD-10-CM

## 2024-05-07 DIAGNOSIS — J84.9 ILD (INTERSTITIAL LUNG DISEASE): ICD-10-CM

## 2024-05-07 DIAGNOSIS — E66.9 CLASS 1 OBESITY: ICD-10-CM

## 2024-05-07 DIAGNOSIS — R91.1 LUNG NODULE: ICD-10-CM

## 2024-05-07 DIAGNOSIS — Z23 ENCOUNTER FOR IMMUNIZATION: Primary | ICD-10-CM

## 2024-05-07 DIAGNOSIS — J98.4 RESTRICTIVE LUNG DISEASE: ICD-10-CM

## 2024-05-07 PROCEDURE — 3077F SYST BP >= 140 MM HG: CPT | Performed by: INTERNAL MEDICINE

## 2024-05-07 PROCEDURE — 1159F MED LIST DOCD IN RCRD: CPT | Performed by: INTERNAL MEDICINE

## 2024-05-07 PROCEDURE — 1160F RVW MEDS BY RX/DR IN RCRD: CPT | Performed by: INTERNAL MEDICINE

## 2024-05-07 PROCEDURE — 3078F DIAST BP <80 MM HG: CPT | Performed by: INTERNAL MEDICINE

## 2024-05-07 PROCEDURE — 99214 OFFICE O/P EST MOD 30 MIN: CPT | Performed by: INTERNAL MEDICINE

## 2024-05-07 RX ORDER — LEVOCETIRIZINE DIHYDROCHLORIDE 5 MG/1
TABLET, FILM COATED ORAL
COMMUNITY
Start: 2024-02-08 | End: 2024-05-07

## 2024-05-07 RX ORDER — DESLORATADINE 5 MG/1
TABLET ORAL
COMMUNITY
Start: 2024-03-19

## 2024-05-07 RX ORDER — ELECTROLYTES/DEXTROSE
SOLUTION, ORAL ORAL
COMMUNITY

## 2024-05-09 DIAGNOSIS — J84.9 ILD (INTERSTITIAL LUNG DISEASE): Primary | ICD-10-CM

## 2024-05-09 DIAGNOSIS — J84.10 PULMONARY FIBROSIS: ICD-10-CM

## 2024-05-09 RX ORDER — NINTEDANIB 150 MG/1
150 CAPSULE ORAL 2 TIMES DAILY WITH MEALS
Qty: 60 CAPSULE | Refills: 11 | Status: SHIPPED | OUTPATIENT
Start: 2024-05-09

## 2024-05-14 DIAGNOSIS — Z51.81 MEDICATION MONITORING ENCOUNTER: Primary | ICD-10-CM

## 2024-06-10 ENCOUNTER — TELEPHONE (OUTPATIENT)
Dept: PULMONOLOGY | Facility: CLINIC | Age: 69
End: 2024-06-10

## 2024-06-10 NOTE — TELEPHONE ENCOUNTER
Provider: LISA ORTIZ     Caller: MARIFER     Phone Number:     [phone]       Reason for Call: MARIFER FROM [facility] [employer] CALLED TO REQUEST AN ORDER FOR THE PTS CPAP SUPPLIES AND THE MOST RECENT OFFICE NOTES. PLEASE ADVISE.     When was the patient last seen: 05/0724

## 2024-06-14 ENCOUNTER — LAB (OUTPATIENT)
Dept: LAB | Facility: HOSPITAL | Age: 69
End: 2024-06-14
Payer: MEDICARE

## 2024-06-14 DIAGNOSIS — Z51.81 MEDICATION MONITORING ENCOUNTER: ICD-10-CM

## 2024-06-14 LAB
ALBUMIN SERPL-MCNC: 4 G/DL (ref 3.5–5.2)
ALBUMIN/GLOB SERPL: 1.4 G/DL
ALP SERPL-CCNC: 145 U/L (ref 39–117)
ALT SERPL W P-5'-P-CCNC: 19 U/L (ref 1–33)
ANION GAP SERPL CALCULATED.3IONS-SCNC: 11.8 MMOL/L (ref 5–15)
AST SERPL-CCNC: 18 U/L (ref 1–32)
BASOPHILS # BLD AUTO: 0.07 10*3/MM3 (ref 0–0.2)
BASOPHILS NFR BLD AUTO: 0.8 % (ref 0–1.5)
BILIRUB SERPL-MCNC: 0.2 MG/DL (ref 0–1.2)
BUN SERPL-MCNC: 12 MG/DL (ref 8–23)
BUN/CREAT SERPL: 17.1 (ref 7–25)
CALCIUM SPEC-SCNC: 9.8 MG/DL (ref 8.6–10.5)
CHLORIDE SERPL-SCNC: 103 MMOL/L (ref 98–107)
CO2 SERPL-SCNC: 25.2 MMOL/L (ref 22–29)
CREAT SERPL-MCNC: 0.7 MG/DL (ref 0.57–1)
DEPRECATED RDW RBC AUTO: 40.6 FL (ref 37–54)
EGFRCR SERPLBLD CKD-EPI 2021: 93.8 ML/MIN/1.73
EOSINOPHIL # BLD AUTO: 0.11 10*3/MM3 (ref 0–0.4)
EOSINOPHIL NFR BLD AUTO: 1.2 % (ref 0.3–6.2)
ERYTHROCYTE [DISTWIDTH] IN BLOOD BY AUTOMATED COUNT: 12.5 % (ref 12.3–15.4)
GLOBULIN UR ELPH-MCNC: 2.8 GM/DL
GLUCOSE SERPL-MCNC: 107 MG/DL (ref 65–99)
HCT VFR BLD AUTO: 40.5 % (ref 34–46.6)
HGB BLD-MCNC: 13.4 G/DL (ref 12–15.9)
IMM GRANULOCYTES # BLD AUTO: 0.04 10*3/MM3 (ref 0–0.05)
IMM GRANULOCYTES NFR BLD AUTO: 0.4 % (ref 0–0.5)
LYMPHOCYTES # BLD AUTO: 3 10*3/MM3 (ref 0.7–3.1)
LYMPHOCYTES NFR BLD AUTO: 32.3 % (ref 19.6–45.3)
MCH RBC QN AUTO: 29.1 PG (ref 26.6–33)
MCHC RBC AUTO-ENTMCNC: 33.1 G/DL (ref 31.5–35.7)
MCV RBC AUTO: 88 FL (ref 79–97)
MONOCYTES # BLD AUTO: 0.73 10*3/MM3 (ref 0.1–0.9)
MONOCYTES NFR BLD AUTO: 7.9 % (ref 5–12)
NEUTROPHILS NFR BLD AUTO: 5.33 10*3/MM3 (ref 1.7–7)
NEUTROPHILS NFR BLD AUTO: 57.4 % (ref 42.7–76)
NRBC BLD AUTO-RTO: 0 /100 WBC (ref 0–0.2)
PLATELET # BLD AUTO: 374 10*3/MM3 (ref 140–450)
PMV BLD AUTO: 10 FL (ref 6–12)
POTASSIUM SERPL-SCNC: 4.4 MMOL/L (ref 3.5–5.2)
PROT SERPL-MCNC: 6.8 G/DL (ref 6–8.5)
RBC # BLD AUTO: 4.6 10*6/MM3 (ref 3.77–5.28)
SODIUM SERPL-SCNC: 140 MMOL/L (ref 136–145)
WBC NRBC COR # BLD AUTO: 9.28 10*3/MM3 (ref 3.4–10.8)

## 2024-06-14 PROCEDURE — 80053 COMPREHEN METABOLIC PANEL: CPT

## 2024-06-14 PROCEDURE — 36415 COLL VENOUS BLD VENIPUNCTURE: CPT

## 2024-06-14 PROCEDURE — 85025 COMPLETE CBC W/AUTO DIFF WBC: CPT

## 2024-07-12 DIAGNOSIS — Z51.81 MEDICATION MONITORING ENCOUNTER: Primary | ICD-10-CM

## 2024-07-15 ENCOUNTER — LAB (OUTPATIENT)
Dept: LAB | Facility: HOSPITAL | Age: 69
End: 2024-07-15
Payer: MEDICARE

## 2024-07-15 DIAGNOSIS — Z51.81 MEDICATION MONITORING ENCOUNTER: ICD-10-CM

## 2024-07-15 LAB
ALBUMIN SERPL-MCNC: 4.1 G/DL (ref 3.5–5.2)
ALBUMIN/GLOB SERPL: 1.5 G/DL
ALP SERPL-CCNC: 115 U/L (ref 39–117)
ALT SERPL W P-5'-P-CCNC: 21 U/L (ref 1–33)
ANION GAP SERPL CALCULATED.3IONS-SCNC: 11.8 MMOL/L (ref 5–15)
AST SERPL-CCNC: 23 U/L (ref 1–32)
BASOPHILS # BLD AUTO: 0.06 10*3/MM3 (ref 0–0.2)
BASOPHILS NFR BLD AUTO: 0.7 % (ref 0–1.5)
BILIRUB SERPL-MCNC: 0.3 MG/DL (ref 0–1.2)
BUN SERPL-MCNC: 12 MG/DL (ref 8–23)
BUN/CREAT SERPL: 16 (ref 7–25)
CALCIUM SPEC-SCNC: 9.5 MG/DL (ref 8.6–10.5)
CHLORIDE SERPL-SCNC: 102 MMOL/L (ref 98–107)
CO2 SERPL-SCNC: 26.2 MMOL/L (ref 22–29)
CREAT SERPL-MCNC: 0.75 MG/DL (ref 0.57–1)
DEPRECATED RDW RBC AUTO: 41.2 FL (ref 37–54)
EGFRCR SERPLBLD CKD-EPI 2021: 86.3 ML/MIN/1.73
EOSINOPHIL # BLD AUTO: 0.09 10*3/MM3 (ref 0–0.4)
EOSINOPHIL NFR BLD AUTO: 1.1 % (ref 0.3–6.2)
ERYTHROCYTE [DISTWIDTH] IN BLOOD BY AUTOMATED COUNT: 12.9 % (ref 12.3–15.4)
GLOBULIN UR ELPH-MCNC: 2.8 GM/DL
GLUCOSE SERPL-MCNC: 96 MG/DL (ref 65–99)
HCT VFR BLD AUTO: 39.3 % (ref 34–46.6)
HGB BLD-MCNC: 12.9 G/DL (ref 12–15.9)
IMM GRANULOCYTES # BLD AUTO: 0.03 10*3/MM3 (ref 0–0.05)
IMM GRANULOCYTES NFR BLD AUTO: 0.4 % (ref 0–0.5)
LYMPHOCYTES # BLD AUTO: 3.05 10*3/MM3 (ref 0.7–3.1)
LYMPHOCYTES NFR BLD AUTO: 37.7 % (ref 19.6–45.3)
MCH RBC QN AUTO: 29.2 PG (ref 26.6–33)
MCHC RBC AUTO-ENTMCNC: 32.8 G/DL (ref 31.5–35.7)
MCV RBC AUTO: 88.9 FL (ref 79–97)
MONOCYTES # BLD AUTO: 0.69 10*3/MM3 (ref 0.1–0.9)
MONOCYTES NFR BLD AUTO: 8.5 % (ref 5–12)
NEUTROPHILS NFR BLD AUTO: 4.17 10*3/MM3 (ref 1.7–7)
NEUTROPHILS NFR BLD AUTO: 51.6 % (ref 42.7–76)
NRBC BLD AUTO-RTO: 0 /100 WBC (ref 0–0.2)
PLATELET # BLD AUTO: 369 10*3/MM3 (ref 140–450)
PMV BLD AUTO: 9.7 FL (ref 6–12)
POTASSIUM SERPL-SCNC: 3.8 MMOL/L (ref 3.5–5.2)
PROT SERPL-MCNC: 6.9 G/DL (ref 6–8.5)
RBC # BLD AUTO: 4.42 10*6/MM3 (ref 3.77–5.28)
SODIUM SERPL-SCNC: 140 MMOL/L (ref 136–145)
WBC NRBC COR # BLD AUTO: 8.09 10*3/MM3 (ref 3.4–10.8)

## 2024-07-15 PROCEDURE — 85025 COMPLETE CBC W/AUTO DIFF WBC: CPT

## 2024-07-15 PROCEDURE — 36415 COLL VENOUS BLD VENIPUNCTURE: CPT

## 2024-07-15 PROCEDURE — 80053 COMPREHEN METABOLIC PANEL: CPT

## 2024-08-12 ENCOUNTER — OFFICE VISIT (OUTPATIENT)
Dept: PULMONOLOGY | Facility: CLINIC | Age: 69
End: 2024-08-12
Payer: MEDICARE

## 2024-08-12 VITALS
DIASTOLIC BLOOD PRESSURE: 65 MMHG | RESPIRATION RATE: 16 BRPM | SYSTOLIC BLOOD PRESSURE: 121 MMHG | OXYGEN SATURATION: 100 % | WEIGHT: 182.6 LBS | HEART RATE: 70 BPM | BODY MASS INDEX: 29.35 KG/M2 | TEMPERATURE: 97.6 F | HEIGHT: 66 IN

## 2024-08-12 DIAGNOSIS — F17.201 TOBACCO ABUSE, IN REMISSION: ICD-10-CM

## 2024-08-12 DIAGNOSIS — J84.9 ILD (INTERSTITIAL LUNG DISEASE): Primary | ICD-10-CM

## 2024-08-12 DIAGNOSIS — J84.10 PULMONARY FIBROSIS: ICD-10-CM

## 2024-08-12 DIAGNOSIS — Z51.81 MEDICATION MONITORING ENCOUNTER: ICD-10-CM

## 2024-08-12 PROCEDURE — 99215 OFFICE O/P EST HI 40 MIN: CPT | Performed by: INTERNAL MEDICINE

## 2024-08-12 PROCEDURE — 3078F DIAST BP <80 MM HG: CPT | Performed by: INTERNAL MEDICINE

## 2024-08-12 PROCEDURE — 3074F SYST BP LT 130 MM HG: CPT | Performed by: INTERNAL MEDICINE

## 2024-08-12 RX ORDER — GINGER ROOT/GINGER ROOT EXT 262.5 MG
CAPSULE ORAL
COMMUNITY
Start: 2024-07-01

## 2024-08-12 RX ORDER — LOPERAMIDE HYDROCHLORIDE 2 MG/1
2 CAPSULE ORAL 4 TIMES DAILY PRN
COMMUNITY

## 2024-08-12 RX ORDER — SIMETHICONE 125 MG
125 TABLET,CHEWABLE ORAL EVERY 6 HOURS PRN
COMMUNITY

## 2024-08-12 RX ORDER — CHOLECALCIFEROL (VITAMIN D3) 125 MCG
CAPSULE ORAL
COMMUNITY
Start: 2024-05-01

## 2024-08-12 RX ORDER — NINTEDANIB 100 MG/1
100 CAPSULE ORAL 2 TIMES DAILY
Qty: 60 CAPSULE | Refills: 11 | Status: SHIPPED | OUTPATIENT
Start: 2024-08-12

## 2024-08-12 NOTE — PROGRESS NOTES
Primary Care Provider  En Blank DO     Referring Provider  No ref. provider found     Chief Complaint  ILD (interstitial lung disease), Abdominal Pain (Pt states she believes this is due to ofev ), Diarrhea (Pt states she believes this is due to ofev /), and Follow-up (3 month )    Subjective          History of Presenting Illness  Patient is a 69-year-old  female with ILD secondary to chronic hypersensitivity pneumonitis here for follow-up.  She has had some worsening dyspnea over time.  Her annual PFTs is showing a slow but steady decline in total lung capacity and FVC.  Since last visit she has been on Ofev for the last few months.  She is on 150 mg twice a day.  Reporting intractable nausea, watery stools and a 15 pound weight loss.  She states that this is despite antiemetics and antidiarrheals and a special diet recommended by specialty pharmacy.  She is asking what we can do to help her.  Dyspnea is at baseline.  Allergies are well-controlled at baseline.  She has an intermittent dry hacking cough that has markedly improved with better allergy control. She gets short of breath walking up 2 flights steps.  Dyspnea is mild to moderate severity, worse with activity relieved with rest.  She states that prior to allergies causing issues she was not having any coughing issues.  She does take Xyzal, Singulair and allergy immunotherapy. Patient denies wheezing, fever, chills, night sweats, swollen glands in the head and neck, unintentional weight loss, hemoptysis, purulent sputum production, dysphagia, chest pain, palpitations, chest tightness, abdominal pain, nausea, vomiting, and diarrhea.  Patient also denies any myalgias, changes in sense of taste and/or smell, sore throat, any other coronavirus or flu-like symptoms.  Patient denies any leg swelling, orthopnea, paroxysmal nocturnal dyspnea.  Patient is able to perform activities of daily living.         Family History   Problem Relation Age of  Onset    Hypertension Mother     Cancer Father         Lung    Diabetes Brother     Cancer Maternal Grandmother         Breast    Malig Hyperthermia Neg Hx         Social History     Socioeconomic History    Marital status:    Tobacco Use    Smoking status: Never     Passive exposure: Never    Smokeless tobacco: Never   Vaping Use    Vaping status: Never Used   Substance and Sexual Activity    Alcohol use: Never    Drug use: Never    Sexual activity: Yes     Partners: Male     Birth control/protection: Post-menopausal, Surgical        Past Medical History:   Diagnosis Date    A-fib     Allergic rhinitis     Coronary artery disease 2/19/2022    Atrial Fibrillation    COVID 03/05/2023    GERD (gastroesophageal reflux disease)     Interstitial lung disease     Sleep apnea, obstructive 4/4/2022    Sleep study results        Immunization History   Administered Date(s) Administered    COVID-19 (MODERNA) 1st,2nd,3rd Dose Monovalent 12/28/2020, 01/26/2021    COVID-19 (MODERNA) Monovalent Original Booster 11/04/2021, 05/23/2022    Fluzone High-Dose 65+YRS 09/30/2020    Influenza TIV (IM) 10/01/2018    Pneumococcal Conjugate 13-Valent (PCV13) 09/30/2020    Pneumococcal Conjugate 20-Valent (PCV20) 04/17/2023    Shingrix 02/10/2022, 06/13/2022       Allergies   Allergen Reactions    Gemfibrozil Myalgia     Muscle/joint pain    Nitrofurantoin Hives    Sulfa Antibiotics Hives          Current Outpatient Medications:     albuterol sulfate  (90 Base) MCG/ACT inhaler, Inhale 2 puffs Every 4 (Four) Hours As Needed for Wheezing., Disp: 18 g, Rfl: 5    Calcium Carb-Cholecalciferol (Calcium 600+D3) 600-20 MG-MCG tablet, , Disp: , Rfl:     cyclobenzaprine (FLEXERIL) 10 MG tablet, Take 1 tablet by mouth 2 (Two) Times a Day As Needed., Disp: , Rfl:     desloratadine (CLARINEX) 5 MG tablet, , Disp: , Rfl:     dicyclomine (BENTYL) 20 MG tablet, Take 1 tablet by mouth 4 (Four) Times a Day., Disp: , Rfl:     fluticasone (FLONASE)  50 MCG/ACT nasal spray, 1 spray into the nostril(s) as directed by provider Daily., Disp: , Rfl:     Lactobacillus (Probiotic Acidophilus) capsule, , Disp: , Rfl:     loperamide (IMODIUM) 2 MG capsule, Take 1 capsule by mouth 4 (Four) Times a Day As Needed for Diarrhea., Disp: , Rfl:     metoprolol succinate XL (TOPROL-XL) 50 MG 24 hr tablet, Take 1 tablet by mouth Daily., Disp: , Rfl:     montelukast (SINGULAIR) 10 MG tablet, Take 1 tablet by mouth Every Night., Disp: , Rfl:     multivitamin with minerals (Multivitamin Adult) tablet tablet, , Disp: , Rfl:     omeprazole (priLOSEC) 20 MG capsule, Take 2 capsules by mouth Daily. 2 tabs, Disp: , Rfl:     rivaroxaban (XARELTO) 20 MG tablet, Take 1 tablet by mouth Daily., Disp: , Rfl:     rosuvastatin (CRESTOR) 10 MG tablet, Take 1 tablet by mouth Every Night., Disp: , Rfl:     simethicone (MYLICON) 125 MG chewable tablet, Chew 1 tablet Every 6 (Six) Hours As Needed for Flatulence., Disp: , Rfl:     benzonatate (TESSALON) 100 MG capsule, Take 1 capsule by mouth 3 (Three) Times a Day As Needed for Cough. (Patient not taking: Reported on 8/12/2024), Disp: 42 capsule, Rfl: 0    melatonin 3 MG tablet, Take 1 tablet by mouth Daily. (Patient not taking: Reported on 8/12/2024), Disp: , Rfl:     Nintedanib Esylate (Ofev) 100 MG capsule, Take 1 capsule by mouth 2 (Two) Times a Day., Disp: 60 capsule, Rfl: 11     Objective     Physical Exam  Vital Signs Reviewed  WDWN, Alert, NAD.    HEENT:  PERRL, EOMI.  OP, nares clear, no sinus tenderness  Neck:  Supple, no JVD, no thyromegaly.  Chest:  good aeration, scant inspiratory Velcro-like crackles at bases bilaterally, tympanic to percussion bilaterally, no work of breathing noted  CV: RRR, no MGR, pulses 2+, equal.  Abd:  Soft, NT, ND, + BS, no HSM  EXT:  no clubbing, no cyanosis, no edema, no joint tenderness  Neuro:  A&Ox3, CN grossly intact, no focal deficits.  Skin: No rashes or lesions noted.    Vital Signs:   /65 (BP  "Location: Left arm, Patient Position: Sitting, Cuff Size: Adult)   Pulse 70   Temp 97.6 °F (36.4 °C) (Oral)   Resp 16   Ht 167.6 cm (66\")   Wt 82.8 kg (182 lb 9.6 oz)   SpO2 100% Comment: Room air  BMI 29.47 kg/m²         Result Review :   Personally reviewed my last office note.  Last CBC personally reviewed with no peripheral eosinophilia and CMP personally reviewed with no evidence of chronic hypercapnic respiratory failure     Assessment and Plan      Assessment:  Chronic hypersensitivity pneumonitis/ILD, well-controlled with minimal symptoms.  She had a course of steroids in the past.  Has had  worsening PFTs over the last couple years with some worsening respiratory symptoms, in particular dyspnea.  Currently on Ofev  Drug-induced diarrhea/nausea secondary to Ofev  Atrial fibrillation  Restrictive lung disease  Seasonal allergies well-controlled     Gastroesophageal reflux disease without esophagitis well-controlled on proton pump inhibitor.      0.6 cm lung nodule.  Stable on recent chest CT scan x2 year.       Therapeutic drug monitoring of Ofev     Tobacco abuse with cigarettes in remission.  Obstructive sleep apnea, well-controlled with CPAP  Class I obesity with BMI 31.2    Plan:  Decrease Ofev to 100 mg twice a day and see if this helps her GI symptoms.  Continue antiemetics and antidiarrheals for now as well.  Will monitor symptoms and next follow-up  Continue to intermittently check CBC and CMP while initiating Ofev therapy.  Reviewed last set of labs from last month and ordered another set today for next month.  Continue Singulair, Xyzal, and allergy immunotherapy per allergist  Continue PPI  Continue annual PFT and 6-minute walk test.  2024 testing personally reviewed  Chest CT shows stable pulmonary fibrosis with no suspicious groundglass opacities or inflammation.  Does have left lung nodule that has been stable in size for over 2 years.  No further follow-up needed regarding lung " nodule  Management of A. fib per cardiology.  On metoprolol and Xarelto  Continue CPAP nightly with naps on current settings  Up-to-date with Prevnar, Pneumovax, RSV vaccine    Follow Up   Return in about 3 months (around 11/12/2024).  Patient was given instructions and counseling regarding her condition or for health maintenance advice. Please see specific information pulled into the AVS if appropriate.     Electronically signed by Harinder Robison MD, 08/12/24, 3:29 PM EDT.

## 2024-08-16 ENCOUNTER — LAB (OUTPATIENT)
Dept: LAB | Facility: HOSPITAL | Age: 69
End: 2024-08-16
Payer: MEDICARE

## 2024-08-16 DIAGNOSIS — Z51.81 MEDICATION MONITORING ENCOUNTER: ICD-10-CM

## 2024-08-16 DIAGNOSIS — J84.10 PULMONARY FIBROSIS: ICD-10-CM

## 2024-08-16 DIAGNOSIS — J84.9 ILD (INTERSTITIAL LUNG DISEASE): ICD-10-CM

## 2024-08-16 DIAGNOSIS — F17.201 TOBACCO ABUSE, IN REMISSION: ICD-10-CM

## 2024-08-16 LAB
ALBUMIN SERPL-MCNC: 4 G/DL (ref 3.5–5.2)
ALBUMIN/GLOB SERPL: 1.5 G/DL
ALP SERPL-CCNC: 103 U/L (ref 39–117)
ALT SERPL W P-5'-P-CCNC: 20 U/L (ref 1–33)
ANION GAP SERPL CALCULATED.3IONS-SCNC: 10.8 MMOL/L (ref 5–15)
AST SERPL-CCNC: 22 U/L (ref 1–32)
BASOPHILS # BLD AUTO: 0.06 10*3/MM3 (ref 0–0.2)
BASOPHILS NFR BLD AUTO: 0.7 % (ref 0–1.5)
BILIRUB SERPL-MCNC: 0.2 MG/DL (ref 0–1.2)
BUN SERPL-MCNC: 13 MG/DL (ref 8–23)
BUN/CREAT SERPL: 20 (ref 7–25)
CALCIUM SPEC-SCNC: 9.4 MG/DL (ref 8.6–10.5)
CHLORIDE SERPL-SCNC: 104 MMOL/L (ref 98–107)
CO2 SERPL-SCNC: 26.2 MMOL/L (ref 22–29)
CREAT SERPL-MCNC: 0.65 MG/DL (ref 0.57–1)
DEPRECATED RDW RBC AUTO: 42.6 FL (ref 37–54)
EGFRCR SERPLBLD CKD-EPI 2021: 95.4 ML/MIN/1.73
EOSINOPHIL # BLD AUTO: 0.13 10*3/MM3 (ref 0–0.4)
EOSINOPHIL NFR BLD AUTO: 1.6 % (ref 0.3–6.2)
ERYTHROCYTE [DISTWIDTH] IN BLOOD BY AUTOMATED COUNT: 13 % (ref 12.3–15.4)
GLOBULIN UR ELPH-MCNC: 2.6 GM/DL
GLUCOSE SERPL-MCNC: 101 MG/DL (ref 65–99)
HCT VFR BLD AUTO: 37.4 % (ref 34–46.6)
HGB BLD-MCNC: 12.5 G/DL (ref 12–15.9)
IMM GRANULOCYTES # BLD AUTO: 0.03 10*3/MM3 (ref 0–0.05)
IMM GRANULOCYTES NFR BLD AUTO: 0.4 % (ref 0–0.5)
LYMPHOCYTES # BLD AUTO: 3.1 10*3/MM3 (ref 0.7–3.1)
LYMPHOCYTES NFR BLD AUTO: 38.7 % (ref 19.6–45.3)
MCH RBC QN AUTO: 30 PG (ref 26.6–33)
MCHC RBC AUTO-ENTMCNC: 33.4 G/DL (ref 31.5–35.7)
MCV RBC AUTO: 89.7 FL (ref 79–97)
MONOCYTES # BLD AUTO: 0.69 10*3/MM3 (ref 0.1–0.9)
MONOCYTES NFR BLD AUTO: 8.6 % (ref 5–12)
NEUTROPHILS NFR BLD AUTO: 4 10*3/MM3 (ref 1.7–7)
NEUTROPHILS NFR BLD AUTO: 50 % (ref 42.7–76)
NRBC BLD AUTO-RTO: 0 /100 WBC (ref 0–0.2)
PLATELET # BLD AUTO: 385 10*3/MM3 (ref 140–450)
PMV BLD AUTO: 9.9 FL (ref 6–12)
POTASSIUM SERPL-SCNC: 4 MMOL/L (ref 3.5–5.2)
PROT SERPL-MCNC: 6.6 G/DL (ref 6–8.5)
RBC # BLD AUTO: 4.17 10*6/MM3 (ref 3.77–5.28)
SODIUM SERPL-SCNC: 141 MMOL/L (ref 136–145)
WBC NRBC COR # BLD AUTO: 8.01 10*3/MM3 (ref 3.4–10.8)

## 2024-08-16 PROCEDURE — 80053 COMPREHEN METABOLIC PANEL: CPT

## 2024-08-16 PROCEDURE — 85025 COMPLETE CBC W/AUTO DIFF WBC: CPT

## 2024-08-16 PROCEDURE — 36415 COLL VENOUS BLD VENIPUNCTURE: CPT

## 2024-10-01 ENCOUNTER — TRANSCRIBE ORDERS (OUTPATIENT)
Dept: CT IMAGING | Facility: HOSPITAL | Age: 69
End: 2024-10-01
Payer: MEDICARE

## 2024-10-01 ENCOUNTER — TRANSCRIBE ORDERS (OUTPATIENT)
Dept: ADMINISTRATIVE | Facility: HOSPITAL | Age: 69
End: 2024-10-01
Payer: MEDICARE

## 2024-10-01 DIAGNOSIS — R10.32 LLQ PAIN: Primary | ICD-10-CM

## 2024-10-14 ENCOUNTER — HOSPITAL ENCOUNTER (OUTPATIENT)
Dept: CT IMAGING | Facility: HOSPITAL | Age: 69
Discharge: HOME | End: 2024-10-14
Admitting: FAMILY MEDICINE
Payer: MEDICARE

## 2024-10-14 DIAGNOSIS — J84.9 ILD (INTERSTITIAL LUNG DISEASE): Primary | ICD-10-CM

## 2024-10-14 DIAGNOSIS — R10.32 LLQ PAIN: ICD-10-CM

## 2024-10-14 DIAGNOSIS — J84.9 ILD (INTERSTITIAL LUNG DISEASE): ICD-10-CM

## 2024-10-14 DIAGNOSIS — R91.1 LUNG NODULE: ICD-10-CM

## 2024-10-14 PROCEDURE — 74176 CT ABD & PELVIS W/O CONTRAST: CPT

## 2024-10-14 PROCEDURE — 71250 CT THORAX DX C-: CPT

## 2024-10-16 DIAGNOSIS — J84.9 ILD (INTERSTITIAL LUNG DISEASE): Primary | ICD-10-CM

## 2024-10-16 DIAGNOSIS — R91.1 LUNG NODULE: ICD-10-CM

## 2024-11-06 NOTE — PROGRESS NOTES
Primary Care Provider  En Blank DO     Referring Provider  No ref. provider found     Chief Complaint  ILD (interstitial lung disease), Cough, and Follow-up (3 month follow up. )    Subjective          History of Presenting Illness  Patient is a 69-year-old female, patient of Dr. Choi who presents for management of ILD secondary to chronic hypersensitivity pneumonitis who presents for a follow-up visit today.  Patient states that since last visit her breathing is at baseline.  Patient states that she does get short of breath that is worse with exertion, mild to moderate in severity, and improved with rest.  Patient states that she is taking albuterol inhaler as needed.  Patient states that she is taking Ofev every day as prescribed.  Ofev dose was decreased last office visit due to patient experiencing diarrhea.  Patient states at times she still does have bouts of diarrhea, however it has improved with the lower dose of Ofev.  Patient is taking Singulair, Xyzal, and allergy immunotherapy.  Patient states that she did have COVID back on 10/7/2024 and is feeling better. Since last office visit patient had chest CT scan completed on 10/14/2024.  Report states Basilar predominant peripheral reticulation and interstitial thickening with mild lower lobe bronchiectasis likely related to chronic interstitial lung disease. No significant change from prior examination. No honeycombing. Stable 6 mm pulmonary nodule within the left lower lobe. Patient denies fever, chills, night sweats, swollen glands in the head and neck, unintentional weight loss, hemoptysis, purulent sputum production, dysphagia, chest pain, palpitations, chest tightness, abdominal pain, nausea, vomiting, and diarrhea.  Patient also denies any myalgias, changes in sense of taste and/or smell, sore throat, any other coronavirus or flu-like symptoms.  Patient denies any leg swelling, orthopnea, paroxysmal nocturnal dyspnea.  Patient is able to  perform activities of daily living.        Review of Systems     Family History   Problem Relation Age of Onset    Hypertension Mother     Cancer Father         Lung    Diabetes Brother     Cancer Maternal Grandmother         Breast    Malig Hyperthermia Neg Hx         Social History     Socioeconomic History    Marital status:    Tobacco Use    Smoking status: Never     Passive exposure: Never    Smokeless tobacco: Never   Vaping Use    Vaping status: Never Used   Substance and Sexual Activity    Alcohol use: Never    Drug use: Never    Sexual activity: Yes     Partners: Male     Birth control/protection: Post-menopausal, Surgical        Past Medical History:   Diagnosis Date    A-fib     Allergic rhinitis     Coronary artery disease 2/19/2022    Atrial Fibrillation    COVID 03/05/2023    GERD (gastroesophageal reflux disease)     Interstitial lung disease     Sleep apnea, obstructive 4/4/2022    Sleep study results        Immunization History   Administered Date(s) Administered    COVID-19 (MODERNA) 1st,2nd,3rd Dose Monovalent 12/28/2020, 01/26/2021    COVID-19 (MODERNA) Monovalent Original Booster 11/04/2021, 05/23/2022    Fluad Quad 65+ 10/31/2024    Fluzone High-Dose 65+YRS 09/30/2020    Influenza TIV (IM) 10/01/2018    Pneumococcal Conjugate 13-Valent (PCV13) 09/30/2020    Pneumococcal Conjugate 20-Valent (PCV20) 04/17/2023    Shingrix 02/10/2022, 06/13/2022       Allergies   Allergen Reactions    Gemfibrozil Myalgia     Muscle/joint pain    Nitrofurantoin Hives    Sulfa Antibiotics Hives          Current Outpatient Medications:     albuterol sulfate  (90 Base) MCG/ACT inhaler, Inhale 2 puffs Every 4 (Four) Hours As Needed for Wheezing., Disp: 18 g, Rfl: 5    Calcium Carb-Cholecalciferol (Calcium 600+D3) 600-20 MG-MCG tablet, , Disp: , Rfl:     cyclobenzaprine (FLEXERIL) 10 MG tablet, Take 1 tablet by mouth 2 (Two) Times a Day As Needed., Disp: , Rfl:     desloratadine (CLARINEX) 5 MG tablet, ,  Disp: , Rfl:     dicyclomine (BENTYL) 20 MG tablet, Take 1 tablet by mouth 4 (Four) Times a Day., Disp: , Rfl:     fluticasone (FLONASE) 50 MCG/ACT nasal spray, Administer 1 spray into the nostril(s) as directed by provider Daily., Disp: , Rfl:     Lactobacillus (Probiotic Acidophilus) capsule, , Disp: , Rfl:     loperamide (IMODIUM) 2 MG capsule, Take 1 capsule by mouth 4 (Four) Times a Day As Needed for Diarrhea., Disp: , Rfl:     melatonin 3 MG tablet, Take 1 tablet by mouth Daily., Disp: , Rfl:     metoprolol succinate XL (TOPROL-XL) 50 MG 24 hr tablet, Take 1 tablet by mouth Daily., Disp: , Rfl:     montelukast (SINGULAIR) 10 MG tablet, Take 1 tablet by mouth Every Night., Disp: , Rfl:     multivitamin with minerals (Multivitamin Adult) tablet tablet, , Disp: , Rfl:     Nintedanib Esylate (Ofev) 100 MG capsule, Take 1 capsule by mouth 2 (Two) Times a Day., Disp: 60 capsule, Rfl: 11    pantoprazole (PROTONIX) 40 MG EC tablet, Take 1 tablet by mouth Daily., Disp: , Rfl:     rivaroxaban (XARELTO) 20 MG tablet, Take 1 tablet by mouth Daily., Disp: , Rfl:     rosuvastatin (CRESTOR) 10 MG tablet, Take 1 tablet by mouth Every Night., Disp: , Rfl:     simethicone (MYLICON) 125 MG chewable tablet, Chew 1 tablet Every 6 (Six) Hours As Needed for Flatulence., Disp: , Rfl:     benzonatate (TESSALON) 100 MG capsule, Take 1 capsule by mouth 3 (Three) Times a Day As Needed for Cough. (Patient not taking: Reported on 11/12/2024), Disp: 42 capsule, Rfl: 0     Objective     Physical Exam  Vital Signs:   WDWN, Alert, NAD.    HEENT:  PERRL, EOMI.  OP, nares clear, no sinus tenderness  Neck:  Supple, no JVD, no thyromegaly.  Lymph: no axillary, cervical, supraclavicular lymphadenopathy noted bilaterally  Chest:  good aeration, with inspiratory Velcro-like crackles at the bases bilaterally, no work of breathing noted  CV: RRR, no MGR, pulses 2+, equal.  Abd:  Soft, NT, ND, + BS, no HSM  EXT:  no clubbing, no cyanosis, no edema, no  "joint tenderness  Neuro:  A&Ox3, CN grossly intact, no focal deficits.  Skin: No rashes or lesions noted.    /58 (BP Location: Right arm, Patient Position: Sitting, Cuff Size: Large Adult)   Pulse 62   Temp 97.5 °F (36.4 °C) (Tympanic)   Resp 16   Ht 167.6 cm (65.98\")   Wt 78.7 kg (173 lb 8 oz)   SpO2 97% Comment: room air  BMI 28.02 kg/m²         Result Review :   I have reviewed Dr. Robison's last office visit note.  I also reviewed chest CT report dated from 10/14/2024.  See scanned report.    Procedures:      CT Chest Without Contrast Diagnostic    Result Date: 10/16/2024  Impression: 1. Basilar predominant peripheral reticulation and interstitial thickening with mild lower lobe bronchiectasis likely related to chronic interstitial lung disease. No significant change from prior examination. 2. No honeycombing. 3. Stable 6 mm pulmonary nodule within the left lower lobe. Electronically Signed: Franky Riley  10/16/2024 11:04 AM EDT  Workstation ID: [id number]    CT Abdomen Pelvis Without Contrast    Result Date: 10/15/2024  Mild inflammation surrounding a diverticulum of the descending colon consistent with diverticulitis without abscess formation or perforation. Electronically Signed: Alfonso Palm MD  10/15/2024 9:26 PM EDT  Workstation ID: [id number]        Assessment and Plan      Assessment:  1. Chronic hypersensitivity pneumonitis/ILD, well-controlled with minimal symptoms.  She had a course of steroids in the past.  Has had  worsening PFTs over the last couple years with some worsening respiratory symptoms, in particular dyspnea.  Currently on Ofev.  2. Drug-induced diarrhea/nausea secondary to Ofev.  3. Therapeutic drug monitoring of Ofev.  4. Restrictive lung disease.  5. 0.6 cm lung nodule.  Stable on recent chest CT scan x2 year.       Therapeutic drug monitoring of Ofev.  6. Obstructive sleep apnea, well-controlled with CPAP.  7. Atrial Fibrillation.  8. GERD: patient is on a PPI.  9. " Seasonal allergies.   10. Tobacco abuse with cigarettes in remission.        Plan:  1.  Continue Ofev as prescribed.  How to take medications and possible side effects of medications discussed with the patient.  Patient verbalized understanding and compliance.  2.  Continue albuterol inhaler as needed.  3. Continue Singulair, Xyzal, and allergy immunotherapy per allergist.  3. Will order CBC and CMP for medication monitoring.  4. Will repeat pulmonary function test and six minute walk test in March 2025. Orders placed today.  5. Will repeat chest CT scan in October 2025. Order placed prior to office visit today.   6.  For GERD,  patient to continue PPI.   Patient is also advised to sleep with the head of the bed elevated and do not eat 3-4 hours prior to bedtime.  7.  Continue CPAP at night and with naps at current settings and clean mask and tubing daily.   8.  Vaccination status: patient reports they are up-to-date with flu, pneumonia, and Covid vaccines.  Patient is advised to continue to follow CDC recommendations such as social distancing wearing a mask and washing hands for at least 20 seconds.  9.  Smoking status:patient is a former cigarette smoker.   10.  Patient to call the office, 911, or go to the ER with new or worsening symptoms.  11.  Follow-up in March 2025, sooner if needed.            Follow Up   Return for with Dr. Robison in March 2025.  Patient was given instructions and counseling regarding her condition or for health maintenance advice. Please see specific information pulled into the AVS if appropriate.

## 2024-11-06 NOTE — PATIENT INSTRUCTIONS
Pulmonary Fibrosis    Pulmonary fibrosis is a type of lung disease that causes scarring. Over time, the scar tissue builds up in the air sacs of your lungs (alveoli). This makes it hard for you to breathe because less oxygen gets into your bloodstream.  Scarring from pulmonary fibrosis is permanent and may lead to other serious health problems.  What are the causes?  There are many different causes of pulmonary fibrosis. In some cases, the cause is not known. This is called idiopathic pulmonary fibrosis. Other causes include:  Exposure to chemicals and substances found in agricultural, farm, construction, or factory work. These include mold, asbestos, silica, metal dusts, and toxic fumes.  Sarcoidosis. In this disease, areas of inflammatory cells (granulomas) form and most often affect the lungs.  Autoimmune diseases. These include diseases such as rheumatoid arthritis, systemic sclerosis, or connective tissue disease.  Taking certain medicines. These include drugs used in radiation therapy or used to treat seizures, heart problems, and some infections.  What increases the risk?  You are more likely to develop this condition if:  You have a family history of the disease.  You are an older person. The condition is more common in older adults.  You have a history of smoking.  You have a job that exposes you to certain chemicals.  You have gastroesophageal reflux disease (GERD).  What are the signs or symptoms?  Symptoms of this condition include:  Difficulty breathing that gets worse with activity.  Shortness of breath (dyspnea).  Dry, hacking cough.  Rapid, shallow breathing during exercise or while at rest.  Other symptoms may include:  Loss of appetite or weight loss  Tiredness (fatigue) or weakness.  Bluish skin and lips.  Rounded and enlarged fingertips (clubbing).  How is this diagnosed?  This condition may be diagnosed based on:  Your symptoms and medical history.  A physical exam.  You may also have tests,  including:  A test that involves looking inside your lungs with an instrument (bronchoscopy).  Imaging studies of your lungs and heart.  Tests to measure how well you are breathing (pulmonary function tests).  Blood tests.  Tests to see how well your lungs work while you are walking (pulmonary stress test).  A procedure to remove a lung tissue sample to look at it under a microscope (biopsy).  How is this treated?  There is no cure for pulmonary fibrosis. Treatment focuses on managing symptoms and preventing scarring from getting worse. This may include:  Medicines, such as:  Steroids to prevent permanent lung changes.  Medicines to suppress your body's defense system (immune system).  Medicines to help with lung function by reducing inflammation or scarring.  Ongoing monitoring with X-rays and lab work.  Oxygen therapy.  Pulmonary rehabilitation.  Surgery. In some cases, a lung transplant is possible.  Follow these instructions at home:    Medicines  Take over-the-counter and prescription medicines only as told by your health care provider.  Keep your vaccinations up to date as recommended by your health care provider.  Activity  Get regular exercise, but do not pick activities that are too strenuous for you. Ask your health care provider what activities are safe for you.  If you have physical limitations, you may get exercise by walking, using a stationary bike, or doing chair exercises.  Ask your health care provider about using oxygen while exercising.  Do breathing exercises as told by your health care provider.  Plan rest periods when you get tired.  General instructions  Do not use any products that contain nicotine or tobacco. These products include cigarettes, chewing tobacco, and vaping devices, such as e-cigarettes. If you need help quitting, ask your health care provider.  If you are exposed to chemicals and substances at work, make sure that you wear a mask or respirator at all times.  Learn to manage  stress. If you need help to do this, ask your health care provider.  Join a pulmonary rehabilitation program or a support group for people with pulmonary fibrosis.  Eat small meals often so you do not get too full. Overeating can make breathing trouble worse.  Maintain a healthy weight. Lose weight if you need to.  Keep all follow-up visits. This is important.  Where to find more information  American Lung Association: www.lung.org  National Heart, Lung, and Blood [place]: www.nhlbi.nih.gov  Pulmonary Fibrosis Foundation: pulmonaryfibrosis.org  Contact a health care provider if:  You have symptoms that do not get better with medicines.  You are not able to be as active as usual.  You have trouble taking a deep breath.  You have a fever or chills.  You have blue lips or skin.  You have a lot of headaches.  You cough up mucus that is dark in color.  You have feelings of depression or sadness.  You are unable to sleep because it is hard to breathe.  Get help right away if:  Your symptoms suddenly worsen.  You have chest pain.  You cough up blood.  You get very confused or sleepy.  These symptoms may be an emergency. Get help right away. Call 911.  Do not wait to see if the symptoms will go away.  Do not drive yourself to the hospital.  Summary  Pulmonary fibrosis is a type of lung disease that causes scar tissue to build up in the air sacs of your lungs (alveoli) over time. This makes it hard for you to breathe because less oxygen gets into your bloodstream.  Scarring from pulmonary fibrosis is permanent and may lead to other serious health problems.  You are more likely to develop this condition if you have a family history of the condition or a job that exposes you to certain chemicals.  There is no cure for pulmonary fibrosis. Treatment focuses on managing symptoms and preventing scarring from getting worse.  This information is not intended to replace advice given to you by your health care provider. Make sure  you discuss any questions you have with your health care provider.  Document Revised: 08/09/2022 Document Reviewed: 08/09/2022  Elsevier Patient Education © 2024 [employer]

## 2024-11-12 ENCOUNTER — LAB (OUTPATIENT)
Facility: HOSPITAL | Age: 69
End: 2024-11-12
Payer: MEDICARE

## 2024-11-12 ENCOUNTER — OFFICE VISIT (OUTPATIENT)
Dept: PULMONOLOGY | Facility: CLINIC | Age: 69
End: 2024-11-12
Payer: MEDICARE

## 2024-11-12 VITALS
BODY MASS INDEX: 27.88 KG/M2 | TEMPERATURE: 97.5 F | RESPIRATION RATE: 16 BRPM | DIASTOLIC BLOOD PRESSURE: 58 MMHG | WEIGHT: 173.5 LBS | HEIGHT: 66 IN | SYSTOLIC BLOOD PRESSURE: 132 MMHG | HEART RATE: 62 BPM | OXYGEN SATURATION: 97 %

## 2024-11-12 DIAGNOSIS — J84.9 ILD (INTERSTITIAL LUNG DISEASE): ICD-10-CM

## 2024-11-12 DIAGNOSIS — G47.33 OSA (OBSTRUCTIVE SLEEP APNEA): ICD-10-CM

## 2024-11-12 DIAGNOSIS — F17.201 TOBACCO ABUSE, IN REMISSION: ICD-10-CM

## 2024-11-12 DIAGNOSIS — J30.2 SEASONAL ALLERGIES: ICD-10-CM

## 2024-11-12 DIAGNOSIS — K21.9 GASTROESOPHAGEAL REFLUX DISEASE, UNSPECIFIED WHETHER ESOPHAGITIS PRESENT: ICD-10-CM

## 2024-11-12 DIAGNOSIS — R05.3 CHRONIC COUGH: ICD-10-CM

## 2024-11-12 DIAGNOSIS — Z51.81 THERAPEUTIC DRUG MONITORING: ICD-10-CM

## 2024-11-12 DIAGNOSIS — R91.1 LUNG NODULE: ICD-10-CM

## 2024-11-12 DIAGNOSIS — I48.0 PAROXYSMAL ATRIAL FIBRILLATION: ICD-10-CM

## 2024-11-12 DIAGNOSIS — J30.2 SEASONAL ALLERGIES: Primary | ICD-10-CM

## 2024-11-12 LAB
ALBUMIN SERPL-MCNC: 4.1 G/DL (ref 3.5–5.2)
ALBUMIN/GLOB SERPL: 1.5 G/DL
ALP SERPL-CCNC: 112 U/L (ref 39–117)
ALT SERPL W P-5'-P-CCNC: 21 U/L (ref 1–33)
ANION GAP SERPL CALCULATED.3IONS-SCNC: 12 MMOL/L (ref 5–15)
AST SERPL-CCNC: 20 U/L (ref 1–32)
BASOPHILS # BLD AUTO: 0.07 10*3/MM3 (ref 0–0.2)
BASOPHILS NFR BLD AUTO: 0.7 % (ref 0–1.5)
BILIRUB SERPL-MCNC: 0.4 MG/DL (ref 0–1.2)
BUN SERPL-MCNC: 13 MG/DL (ref 8–23)
BUN/CREAT SERPL: 18.6 (ref 7–25)
CALCIUM SPEC-SCNC: 10 MG/DL (ref 8.6–10.5)
CHLORIDE SERPL-SCNC: 101 MMOL/L (ref 98–107)
CO2 SERPL-SCNC: 27 MMOL/L (ref 22–29)
CREAT SERPL-MCNC: 0.7 MG/DL (ref 0.57–1)
DEPRECATED RDW RBC AUTO: 40 FL (ref 37–54)
EGFRCR SERPLBLD CKD-EPI 2021: 93.8 ML/MIN/1.73
EOSINOPHIL # BLD AUTO: 0.11 10*3/MM3 (ref 0–0.4)
EOSINOPHIL NFR BLD AUTO: 1.1 % (ref 0.3–6.2)
ERYTHROCYTE [DISTWIDTH] IN BLOOD BY AUTOMATED COUNT: 12.1 % (ref 12.3–15.4)
GLOBULIN UR ELPH-MCNC: 2.7 GM/DL
GLUCOSE SERPL-MCNC: 97 MG/DL (ref 65–99)
HCT VFR BLD AUTO: 40.4 % (ref 34–46.6)
HGB BLD-MCNC: 12.9 G/DL (ref 12–15.9)
IMM GRANULOCYTES # BLD AUTO: 0.03 10*3/MM3 (ref 0–0.05)
IMM GRANULOCYTES NFR BLD AUTO: 0.3 % (ref 0–0.5)
LYMPHOCYTES # BLD AUTO: 4.36 10*3/MM3 (ref 0.7–3.1)
LYMPHOCYTES NFR BLD AUTO: 42.8 % (ref 19.6–45.3)
MCH RBC QN AUTO: 28.9 PG (ref 26.6–33)
MCHC RBC AUTO-ENTMCNC: 31.9 G/DL (ref 31.5–35.7)
MCV RBC AUTO: 90.6 FL (ref 79–97)
MONOCYTES # BLD AUTO: 0.83 10*3/MM3 (ref 0.1–0.9)
MONOCYTES NFR BLD AUTO: 8.1 % (ref 5–12)
NEUTROPHILS NFR BLD AUTO: 4.79 10*3/MM3 (ref 1.7–7)
NEUTROPHILS NFR BLD AUTO: 47 % (ref 42.7–76)
NRBC BLD AUTO-RTO: 0 /100 WBC (ref 0–0.2)
PLATELET # BLD AUTO: 408 10*3/MM3 (ref 140–450)
PMV BLD AUTO: 9.9 FL (ref 6–12)
POTASSIUM SERPL-SCNC: 4.5 MMOL/L (ref 3.5–5.2)
PROT SERPL-MCNC: 6.8 G/DL (ref 6–8.5)
RBC # BLD AUTO: 4.46 10*6/MM3 (ref 3.77–5.28)
SODIUM SERPL-SCNC: 140 MMOL/L (ref 136–145)
WBC NRBC COR # BLD AUTO: 10.19 10*3/MM3 (ref 3.4–10.8)

## 2024-11-12 PROCEDURE — 36415 COLL VENOUS BLD VENIPUNCTURE: CPT

## 2024-11-12 PROCEDURE — 85025 COMPLETE CBC W/AUTO DIFF WBC: CPT

## 2024-11-12 PROCEDURE — 80053 COMPREHEN METABOLIC PANEL: CPT

## 2024-11-12 RX ORDER — PANTOPRAZOLE SODIUM 40 MG/1
1 TABLET, DELAYED RELEASE ORAL DAILY
COMMUNITY
Start: 2024-10-15

## 2024-11-13 ENCOUNTER — TELEPHONE (OUTPATIENT)
Dept: PULMONOLOGY | Facility: CLINIC | Age: 69
End: 2024-11-13
Payer: MEDICARE

## 2024-11-13 NOTE — TELEPHONE ENCOUNTER
05/18/2023  Renu Hamlin is a 56 y.o., female.      Pre-op Assessment    I have reviewed the Patient Summary Reports.     I have reviewed the Nursing Notes.    I have reviewed the Medications.     Review of Systems  Anesthesia Hx:  Possible pseudocholinesterase deficiency Neg history of prior surgery. Denies Family Hx of Anesthesia complications.   Denies Personal Hx of Anesthesia complications.   Social:  Former Smoker    Hematology/Oncology:  Hematology Normal   Oncology Normal     EENT/Dental:EENT/Dental Normal   Cardiovascular:  Cardiovascular Normal     Pulmonary:  Pulmonary Normal    Renal/:  Renal/ Normal     Hepatic/GI:  Hepatic/GI Normal    Musculoskeletal:  Musculoskeletal Normal    Neurological:  Neurology Normal    Endocrine:   Diabetes, well controlled, type 1    Dermatological:  Skin Normal    Psych:  Psychiatric Normal           Physical Exam  General: Well nourished    Airway:  Mallampati: II   Mouth Opening: Normal  TM Distance: Normal  Tongue: Normal  Neck ROM: Normal ROM    Dental:  Intact        Anesthesia Plan  Type of Anesthesia, risks & benefits discussed:    Anesthesia Type: Gen ETT  Post Op Pain Control Plan: multimodal analgesia  Airway Plan: Direct, Post-Induction  Informed Consent: Informed consent signed with the Patient and all parties understand the risks and agree with anesthesia plan.  All questions answered. Patient consented to blood products? No  ASA Score: 2  Day of Surgery Review of History & Physical: H&P Update referred to the surgeon/provider.    Ready For Surgery From Anesthesia Perspective.     .         Caller: Rach Ashby    Relationship: Self    Best call back number: [phone]     Which medication are you concerned about: OSEV    Who prescribed you this medication: DR. ARORA    When did you start taking this medication: MAY 13 2024    What are your concerns: PATIENT NEEDS TO SEE IF THE INS SHE IS SWITCHING TO WILL COVER OSEV SHE CURRENTLY IS USING [facility] AND THIS IS NOT ON HER INSURANCE THAT SHE WANTS TO SWITCH TO. SHE WOULD LIKE TO SPEAK TO TAWANNA ABOUT THIS ISSUE ASAP.

## 2025-02-11 DIAGNOSIS — Z51.81 MEDICATION MONITORING ENCOUNTER: Primary | ICD-10-CM

## 2025-03-04 ENCOUNTER — LAB (OUTPATIENT)
Facility: HOSPITAL | Age: 70
End: 2025-03-04
Payer: MEDICARE

## 2025-03-04 DIAGNOSIS — Z51.81 MEDICATION MONITORING ENCOUNTER: ICD-10-CM

## 2025-03-04 LAB
ALBUMIN SERPL-MCNC: 3.8 G/DL (ref 3.5–5.2)
ALBUMIN/GLOB SERPL: 1.5 G/DL
ALP SERPL-CCNC: 105 U/L (ref 39–117)
ALT SERPL W P-5'-P-CCNC: 15 U/L (ref 1–33)
ANION GAP SERPL CALCULATED.3IONS-SCNC: 13 MMOL/L (ref 5–15)
AST SERPL-CCNC: 20 U/L (ref 1–32)
BASOPHILS # BLD AUTO: 0.06 10*3/MM3 (ref 0–0.2)
BASOPHILS NFR BLD AUTO: 0.8 % (ref 0–1.5)
BILIRUB SERPL-MCNC: 0.2 MG/DL (ref 0–1.2)
BUN SERPL-MCNC: 16 MG/DL (ref 8–23)
BUN/CREAT SERPL: 25.4 (ref 7–25)
CALCIUM SPEC-SCNC: 9.4 MG/DL (ref 8.6–10.5)
CHLORIDE SERPL-SCNC: 104 MMOL/L (ref 98–107)
CO2 SERPL-SCNC: 25 MMOL/L (ref 22–29)
CREAT SERPL-MCNC: 0.63 MG/DL (ref 0.57–1)
DEPRECATED RDW RBC AUTO: 39.8 FL (ref 37–54)
EGFRCR SERPLBLD CKD-EPI 2021: 96.2 ML/MIN/1.73
EOSINOPHIL # BLD AUTO: 0.12 10*3/MM3 (ref 0–0.4)
EOSINOPHIL NFR BLD AUTO: 1.6 % (ref 0.3–6.2)
ERYTHROCYTE [DISTWIDTH] IN BLOOD BY AUTOMATED COUNT: 12.1 % (ref 12.3–15.4)
GLOBULIN UR ELPH-MCNC: 2.6 GM/DL
GLUCOSE SERPL-MCNC: 112 MG/DL (ref 65–99)
HCT VFR BLD AUTO: 39.1 % (ref 34–46.6)
HGB BLD-MCNC: 12.9 G/DL (ref 12–15.9)
IMM GRANULOCYTES # BLD AUTO: 0.03 10*3/MM3 (ref 0–0.05)
IMM GRANULOCYTES NFR BLD AUTO: 0.4 % (ref 0–0.5)
LYMPHOCYTES # BLD AUTO: 3.16 10*3/MM3 (ref 0.7–3.1)
LYMPHOCYTES NFR BLD AUTO: 42.6 % (ref 19.6–45.3)
MCH RBC QN AUTO: 29.7 PG (ref 26.6–33)
MCHC RBC AUTO-ENTMCNC: 33 G/DL (ref 31.5–35.7)
MCV RBC AUTO: 89.9 FL (ref 79–97)
MONOCYTES # BLD AUTO: 0.71 10*3/MM3 (ref 0.1–0.9)
MONOCYTES NFR BLD AUTO: 9.6 % (ref 5–12)
NEUTROPHILS NFR BLD AUTO: 3.33 10*3/MM3 (ref 1.7–7)
NEUTROPHILS NFR BLD AUTO: 45 % (ref 42.7–76)
NRBC BLD AUTO-RTO: 0 /100 WBC (ref 0–0.2)
PLATELET # BLD AUTO: 372 10*3/MM3 (ref 140–450)
PMV BLD AUTO: 9.7 FL (ref 6–12)
POTASSIUM SERPL-SCNC: 4.1 MMOL/L (ref 3.5–5.2)
PROT SERPL-MCNC: 6.4 G/DL (ref 6–8.5)
RBC # BLD AUTO: 4.35 10*6/MM3 (ref 3.77–5.28)
SODIUM SERPL-SCNC: 142 MMOL/L (ref 136–145)
WBC NRBC COR # BLD AUTO: 7.41 10*3/MM3 (ref 3.4–10.8)

## 2025-03-04 PROCEDURE — 80053 COMPREHEN METABOLIC PANEL: CPT

## 2025-03-04 PROCEDURE — 85025 COMPLETE CBC W/AUTO DIFF WBC: CPT

## 2025-03-04 PROCEDURE — 36415 COLL VENOUS BLD VENIPUNCTURE: CPT

## 2025-03-17 ENCOUNTER — HOSPITAL ENCOUNTER (OUTPATIENT)
Dept: RESPIRATORY THERAPY | Facility: HOSPITAL | Age: 70
Discharge: HOME | End: 2025-03-17
Payer: MEDICARE

## 2025-03-17 DIAGNOSIS — I48.0 PAROXYSMAL ATRIAL FIBRILLATION: ICD-10-CM

## 2025-03-17 DIAGNOSIS — F17.201 TOBACCO ABUSE, IN REMISSION: ICD-10-CM

## 2025-03-17 DIAGNOSIS — K21.9 GASTROESOPHAGEAL REFLUX DISEASE, UNSPECIFIED WHETHER ESOPHAGITIS PRESENT: ICD-10-CM

## 2025-03-17 DIAGNOSIS — G47.33 OSA (OBSTRUCTIVE SLEEP APNEA): ICD-10-CM

## 2025-03-17 DIAGNOSIS — J30.2 SEASONAL ALLERGIES: ICD-10-CM

## 2025-03-17 DIAGNOSIS — R05.3 CHRONIC COUGH: ICD-10-CM

## 2025-03-17 DIAGNOSIS — R91.1 LUNG NODULE: ICD-10-CM

## 2025-03-17 DIAGNOSIS — J84.9 ILD (INTERSTITIAL LUNG DISEASE): ICD-10-CM

## 2025-03-17 DIAGNOSIS — Z51.81 THERAPEUTIC DRUG MONITORING: ICD-10-CM

## 2025-03-17 PROCEDURE — 94729 DIFFUSING CAPACITY: CPT

## 2025-03-17 PROCEDURE — 94060 EVALUATION OF WHEEZING: CPT

## 2025-03-17 PROCEDURE — 94726 PLETHYSMOGRAPHY LUNG VOLUMES: CPT

## 2025-03-17 PROCEDURE — 94618 PULMONARY STRESS TESTING: CPT

## 2025-03-17 RX ORDER — ALBUTEROL SULFATE 0.83 MG/ML
2.5 SOLUTION RESPIRATORY (INHALATION) ONCE
Status: COMPLETED | OUTPATIENT
Start: 2025-03-17 | End: 2025-03-17

## 2025-03-17 RX ADMIN — ALBUTEROL SULFATE 2.5 MG: 2.5 SOLUTION RESPIRATORY (INHALATION) at 13:29

## 2025-03-17 NOTE — PROGRESS NOTES
Exercise Oximetry    Patient Name:Rach Ashby   MRN: [id number]   Date: 03/17/25             ROOM AIR BASELINE   SpO2% 98   Heart Rate 95        EXERCISE ON ROOM AIR SpO2% EXERCISE ON O2 @  LPM SpO2%   1 MINUTE 95 1 MINUTE    2 MINUTES 95 2 MINUTES    3 MINUTES 95 3 MINUTES    4 MINUTES 94 4 MINUTES    5 MINUTES 95 5 MINUTES    6 MINUTES 94 6 MINUTES               Distance Walked  960' Distance Walked   Dyspnea (Yara Scale)  Pre-0 Post-0 Dyspnea (Yara Scale)   Fatigue (Yara Scale)  2 Fatigue (Yara Scale)   SpO2% Post Exercise  97 SpO2% Post Exercise   HR Post Exercise  94 HR Post Exercise   Time to Recovery  NA Time to Recovery     Comments:

## 2025-05-04 NOTE — PROGRESS NOTES
Primary Care Provider  En Blank DO     Referring Provider  No ref. provider found     Chief Complaint  Cough, Seasonal allergies, ILD (interstitial lung disease), and Follow-up (6 month. )    Subjective          History of Presenting Illness  Patient is a 69-year-old female, patient of Dr. Choi who presents for management of ILD secondary to chronic hypersensitivity pneumonitis who presents for a follow-up visit today.  Patient states that since last visit her breathing is at baseline.  Patient states that she does get short of breath that is worse with exertion, mild to moderate in severity, and improved with rest.  Patient states that she is taking albuterol inhaler as needed.  Patient states that she is taking Ofev every day as prescribed.  Patient states that she is using her CPAP machine when she sleeps.     Patient is taking Singulair, Xyzal, and allergy immunotherapy.     Since last office visit patient had a pulmonary function test and a 6-minute walk test completed on 3/17/2025.  Pulmonary function test report states no obstruction.  Mild restrictive defect.  Mildly reduced diffusion capacity.  Patient's 6-minute walk test did not show any significant desaturations on room air.    Patient had a CBC and a CMP completed on 3/4/2025.  White blood cell count came back normal.  LFTs came back normal.    Patient denies fever, chills, night sweats, swollen glands in the head and neck, unintentional weight loss, hemoptysis, purulent sputum production, dysphagia, chest pain, palpitations, chest tightness, abdominal pain, nausea, vomiting, and diarrhea.   Patient denies any leg swelling, orthopnea, paroxysmal nocturnal dyspnea.  Patient is able to perform activities of daily living.        Review of Systems     Family History   Problem Relation Age of Onset    Hypertension Mother     Cancer Father         Lung    Diabetes Brother     Cancer Maternal Grandmother         Breast    Malig Hyperthermia Neg Hx          Social History     Socioeconomic History    Marital status:    Tobacco Use    Smoking status: Never     Passive exposure: Never    Smokeless tobacco: Never   Vaping Use    Vaping status: Never Used   Substance and Sexual Activity    Alcohol use: Never    Drug use: Never    Sexual activity: Yes     Partners: Male     Birth control/protection: Post-menopausal, Surgical        Past Medical History:   Diagnosis Date    A-fib     Allergic rhinitis     Coronary artery disease 2/19/2022    Atrial Fibrillation    COVID 03/05/2023    GERD (gastroesophageal reflux disease)     Interstitial lung disease     Sleep apnea, obstructive 4/4/2022    Sleep study results        Immunization History   Administered Date(s) Administered    COVID-19 (MODERNA) 1st,2nd,3rd Dose Monovalent 12/28/2020, 01/26/2021    COVID-19 (MODERNA) Monovalent Original Booster 11/04/2021, 05/23/2022    Fluad Quad 65+ 10/31/2024    Fluzone High-Dose 65+YRS 09/30/2020    Influenza TIV (IM) 10/01/2018    Pneumococcal Conjugate 13-Valent (PCV13) 09/30/2020    Pneumococcal Conjugate 20-Valent (PCV20) 04/17/2023    Shingrix 02/10/2022, 06/13/2022       Allergies   Allergen Reactions    Gemfibrozil Myalgia     Muscle/joint pain    Nitrofurantoin Hives    Sulfa Antibiotics Hives          Current Outpatient Medications:     albuterol sulfate  (90 Base) MCG/ACT inhaler, Inhale 2 puffs Every 4 (Four) Hours As Needed for Wheezing., Disp: 18 g, Rfl: 5    benzonatate (TESSALON) 100 MG capsule, Take 1 capsule by mouth 3 (Three) Times a Day As Needed for Cough., Disp: 42 capsule, Rfl: 0    Calcium Carb-Cholecalciferol (Calcium 600+D3) 600-20 MG-MCG tablet, , Disp: , Rfl:     cyclobenzaprine (FLEXERIL) 10 MG tablet, Take 1 tablet by mouth 2 (Two) Times a Day As Needed., Disp: , Rfl:     desloratadine (CLARINEX) 5 MG tablet, , Disp: , Rfl:     dicyclomine (BENTYL) 20 MG tablet, Take 1 tablet by mouth 4 (Four) Times a Day., Disp: , Rfl:     fluticasone  (FLONASE) 50 MCG/ACT nasal spray, Administer 1 spray into the nostril(s) as directed by provider Daily., Disp: , Rfl:     Lactobacillus (Probiotic Acidophilus) capsule, , Disp: , Rfl:     loperamide (IMODIUM) 2 MG capsule, Take 1 capsule by mouth 4 (Four) Times a Day As Needed for Diarrhea., Disp: , Rfl:     melatonin 3 MG tablet, Take 1 tablet by mouth Daily., Disp: , Rfl:     metoprolol succinate XL (TOPROL-XL) 50 MG 24 hr tablet, Take 1 tablet by mouth Daily., Disp: , Rfl:     montelukast (SINGULAIR) 10 MG tablet, Take 1 tablet by mouth Every Night., Disp: , Rfl:     multivitamin with minerals (Multivitamin Adult) tablet tablet, , Disp: , Rfl:     Nintedanib Esylate (Ofev) 100 MG capsule, Take 1 capsule by mouth 2 (Two) Times a Day., Disp: 60 capsule, Rfl: 11    Omeprazole 20 MG Tablet Delayed Release Dispersible, , Disp: , Rfl:     rivaroxaban (XARELTO) 20 MG tablet, Take 1 tablet by mouth Daily., Disp: , Rfl:     rosuvastatin (CRESTOR) 10 MG tablet, Take 1 tablet by mouth Every Night., Disp: , Rfl:     simethicone (MYLICON) 125 MG chewable tablet, Chew 1 tablet Every 6 (Six) Hours As Needed for Flatulence., Disp: , Rfl:     Zinc 100 MG tablet, , Disp: , Rfl:     pantoprazole (PROTONIX) 40 MG EC tablet, Take 1 tablet by mouth Daily., Disp: , Rfl:      Objective     Physical Exam  Vital Signs:   WDWN, Alert, NAD.    HEENT:  PERRL, EOMI.  OP, nares clear, no sinus tenderness  Neck:  Supple, no JVD, no thyromegaly.  Lymph: no axillary, cervical, supraclavicular lymphadenopathy noted bilaterally  Chest:   good aeration, with inspiratory Velcro-like crackles at the bases bilaterally, no work of breathing noted   CV: RRR, no MGR, pulses 2+, equal.  Abd:  Soft, NT, ND, + BS, no HSM  EXT:  no clubbing, no cyanosis, no edema, no joint tenderness  Neuro:  A&Ox3, CN grossly intact, no focal deficits.  Skin: No rashes or lesions noted.    /83 (BP Location: Right arm, Patient Position: Sitting, Cuff Size: Large Adult)   " Pulse 67   Temp 97.5 °F (36.4 °C) (Oral)   Resp 16   Ht 167.6 cm (65.98\")   Wt 77.6 kg (171 lb)   SpO2 96% Comment: room air  BMI 27.61 kg/m²         Result Review :   I have reviewed my last office visit note.  I also reviewed CBC and CMP results dated 1/24/2025.  I also reviewed pulmonary function test report and 6-minute walk test report dated from 3/17/2025.  See scanned reports.    Procedures:    CMP          8/16/2024    12:42 11/12/2024    12:36 3/4/2025    07:08   CMP   Glucose 101  97  112    BUN 13  13  16    Creatinine 0.65  0.70  0.63    EGFR 95.4  93.8  96.2    Sodium 141  140  142    Potassium 4.0  4.5  4.1    Chloride 104  101  104    Calcium 9.4  10.0  9.4    Total Protein 6.6  6.8  6.4    Albumin 4.0  4.1  3.8    Globulin 2.6  2.7  2.6    Total Bilirubin 0.2  0.4  0.2    Alkaline Phosphatase 103  112  105    AST (SGOT) 22  20  20    ALT (SGPT) 20  21  15    Albumin/Globulin Ratio 1.5  1.5  1.5    BUN/Creatinine Ratio 20.0  18.6  25.4    Anion Gap 10.8  12.0  13.0      CBC          8/16/2024    12:42 11/12/2024    12:36 3/4/2025    07:08   CBC   WBC 8.01  10.19  7.41    RBC 4.17  4.46  4.35    Hemoglobin 12.5  12.9  12.9    Hematocrit 37.4  40.4  39.1    MCV 89.7  90.6  89.9    MCH 30.0  28.9  29.7    MCHC 33.4  31.9  33.0    RDW 13.0  12.1  12.1    Platelets 385  408  372      CBC w/diff          8/16/2024    12:42 11/12/2024    12:36 3/4/2025    07:08   CBC w/Diff   WBC 8.01  10.19  7.41    RBC 4.17  4.46  4.35    Hemoglobin 12.5  12.9  12.9    Hematocrit 37.4  40.4  39.1    MCV 89.7  90.6  89.9    MCH 30.0  28.9  29.7    MCHC 33.4  31.9  33.0    RDW 13.0  12.1  12.1    Platelets 385  408  372    Neutrophil Rel % 50.0  47.0  45.0    Immature Granulocyte Rel % 0.4  0.3  0.4    Lymphocyte Rel % 38.7  42.8  42.6    Monocyte Rel % 8.6  8.1  9.6    Eosinophil Rel % 1.6  1.1  1.6    Basophil Rel % 0.7  0.7  0.8           Assessment and Plan      Assessment:  1. Chronic hypersensitivity " pneumonitis/ILD, well-controlled with minimal symptoms.  She had a course of steroids in the past.  Has had  worsening PFTs over the last couple years with some worsening respiratory symptoms, in particular dyspnea.  Currently on Ofev.  2. Drug-induced diarrhea/nausea secondary to Ofev.  3. Therapeutic drug monitoring of Ofev.  4. Restrictive lung disease.  5. 0.6 cm lung nodule.  Stable on recent chest CT scan x2 year.       Therapeutic drug monitoring of Ofev.  6. Obstructive sleep apnea, well-controlled with CPAP.  7. Atrial Fibrillation.  8. GERD: patient is on a PPI.  9. Seasonal allergies.   10. Tobacco abuse with cigarettes in remission.        Plan:  1. Continue Ofev as prescribed.    2. Continue albuterol inhaler as needed.  3.  Continue Singulair, Xyzal, and allergy immunotherapy per allergist.  3.  Will order CBC and CMP for medication monitoring.  4.  Continue annual PFTs and 6-minute walk test.  5.  Will repeat chest CT scan in October 2025. Order already placed.   6.  For GERD,  patient to continue PPI.   Patient is also advised to sleep with the head of the bed elevated and do not eat 3-4 hours prior to bedtime.  7.  Continue CPAP at night and with naps at current settings and clean mask and tubing daily.   8.  Patient to call the office, 911, or go to the ER with new or worsening symptoms.  9.  Follow-up in October 2025, sooner if needed.           Follow Up   Return in about 6 months (around 11/13/2025).  Patient was given instructions and counseling regarding her condition or for health maintenance advice. Please see specific information pulled into the AVS if appropriate.

## 2025-05-04 NOTE — PATIENT INSTRUCTIONS
Pulmonary Fibrosis    Pulmonary fibrosis is a type of lung disease that causes scarring. Over time, the scar tissue builds up in the air sacs of your lungs (alveoli). This makes it hard for you to breathe because less oxygen gets into your bloodstream.  Scarring from pulmonary fibrosis is permanent and may lead to other serious health problems.  What are the causes?  There are many different causes of pulmonary fibrosis. In some cases, the cause is not known. This is called idiopathic pulmonary fibrosis. Other causes include:  Exposure to chemicals and substances found in agricultural, farm, construction, or factory work. These include mold, asbestos, silica, metal dusts, and toxic fumes.  Sarcoidosis. In this disease, areas of inflammatory cells (granulomas) form and most often affect the lungs.  Autoimmune diseases. These include diseases such as rheumatoid arthritis, systemic sclerosis, or connective tissue disease.  Taking certain medicines. These include drugs used in radiation therapy or used to treat seizures, heart problems, and some infections.  What increases the risk?  You are more likely to develop this condition if:  You have a family history of the disease.  You are an older person. The condition is more common in older adults.  You have a history of smoking.  You have a job that exposes you to certain chemicals.  You have gastroesophageal reflux disease (GERD).  What are the signs or symptoms?  Symptoms of this condition include:  Difficulty breathing that gets worse with activity.  Shortness of breath (dyspnea).  Dry, hacking cough.  Rapid, shallow breathing during exercise or while at rest.  Other symptoms may include:  Loss of appetite or weight loss  Tiredness (fatigue) or weakness.  Bluish skin and lips.  Rounded and enlarged fingertips (clubbing).  How is this diagnosed?  This condition may be diagnosed based on:  Your symptoms and medical history.  A physical exam.  You may also have tests,  including:  A test that involves looking inside your lungs with an instrument (bronchoscopy).  Imaging studies of your lungs and heart.  Tests to measure how well you are breathing (pulmonary function tests).  Blood tests.  Tests to see how well your lungs work while you are walking (pulmonary stress test).  A procedure to remove a lung tissue sample to look at it under a microscope (biopsy).  How is this treated?  There is no cure for pulmonary fibrosis. Treatment focuses on managing symptoms and preventing scarring from getting worse. This may include:  Medicines, such as:  Steroids to prevent permanent lung changes.  Medicines to suppress your body's defense system (immune system).  Medicines to help with lung function by reducing inflammation or scarring.  Ongoing monitoring with X-rays and lab work.  Oxygen therapy.  Pulmonary rehabilitation.  Surgery. In some cases, a lung transplant is possible.  Follow these instructions at home:    Medicines  Take over-the-counter and prescription medicines only as told by your health care provider.  Keep your vaccinations up to date as recommended by your health care provider.  Activity  Get regular exercise, but do not pick activities that are too strenuous for you. Ask your health care provider what activities are safe for you.  If you have physical limitations, you may get exercise by walking, using a stationary bike, or doing chair exercises.  Ask your health care provider about using oxygen while exercising.  Do breathing exercises as told by your health care provider.  Plan rest periods when you get tired.  General instructions  Do not use any products that contain nicotine or tobacco. These products include cigarettes, chewing tobacco, and vaping devices, such as e-cigarettes. If you need help quitting, ask your health care provider.  If you are exposed to chemicals and substances at work, make sure that you wear a mask or respirator at all times.  Learn to manage  stress. If you need help to do this, ask your health care provider.  Join a pulmonary rehabilitation program or a support group for people with pulmonary fibrosis.  Eat small meals often so you do not get too full. Overeating can make breathing trouble worse.  Maintain a healthy weight. Lose weight if you need to.  Keep all follow-up visits. This is important.  Where to find more information  American Lung Association: www.lung.org  National Heart, Lung, and Blood Coy: www.nhlbi.nih.gov  Pulmonary Fibrosis Foundation: pulmonaryfibrosis.org  Contact a health care provider if:  You have symptoms that do not get better with medicines.  You are not able to be as active as usual.  You have trouble taking a deep breath.  You have a fever or chills.  You have blue lips or skin.  You have a lot of headaches.  You cough up mucus that is dark in color.  You have feelings of depression or sadness.  You are unable to sleep because it is hard to breathe.  Get help right away if:  Your symptoms suddenly worsen.  You have chest pain.  You cough up blood.  You get very confused or sleepy.  These symptoms may be an emergency. Get help right away. Call 911.  Do not wait to see if the symptoms will go away.  Do not drive yourself to the hospital.  Summary  Pulmonary fibrosis is a type of lung disease that causes scar tissue to build up in the air sacs of your lungs (alveoli) over time. This makes it hard for you to breathe because less oxygen gets into your bloodstream.  Scarring from pulmonary fibrosis is permanent and may lead to other serious health problems.  You are more likely to develop this condition if you have a family history of the condition or a job that exposes you to certain chemicals.  There is no cure for pulmonary fibrosis. Treatment focuses on managing symptoms and preventing scarring from getting worse.  This information is not intended to replace advice given to you by your health care provider. Make sure  you discuss any questions you have with your health care provider.  Document Revised: 08/09/2022 Document Reviewed: 08/09/2022  [employer] Patient Education © 2024 [employer]

## 2025-05-11 ENCOUNTER — TELEPHONE (OUTPATIENT)
Dept: PULMONOLOGY | Facility: CLINIC | Age: 70
End: 2025-05-11
Payer: MEDICARE

## 2025-05-13 ENCOUNTER — OFFICE VISIT (OUTPATIENT)
Dept: PULMONOLOGY | Facility: CLINIC | Age: 70
End: 2025-05-13
Payer: MEDICARE

## 2025-05-13 VITALS
TEMPERATURE: 97.5 F | HEIGHT: 66 IN | DIASTOLIC BLOOD PRESSURE: 83 MMHG | HEART RATE: 67 BPM | SYSTOLIC BLOOD PRESSURE: 146 MMHG | OXYGEN SATURATION: 96 % | WEIGHT: 171 LBS | RESPIRATION RATE: 16 BRPM | BODY MASS INDEX: 27.48 KG/M2

## 2025-05-13 DIAGNOSIS — G47.33 OSA (OBSTRUCTIVE SLEEP APNEA): ICD-10-CM

## 2025-05-13 DIAGNOSIS — K21.9 GASTROESOPHAGEAL REFLUX DISEASE, UNSPECIFIED WHETHER ESOPHAGITIS PRESENT: ICD-10-CM

## 2025-05-13 DIAGNOSIS — F17.201 TOBACCO ABUSE, IN REMISSION: ICD-10-CM

## 2025-05-13 DIAGNOSIS — J30.2 SEASONAL ALLERGIES: Primary | ICD-10-CM

## 2025-05-13 DIAGNOSIS — J84.9 ILD (INTERSTITIAL LUNG DISEASE): ICD-10-CM

## 2025-05-13 DIAGNOSIS — I48.0 PAROXYSMAL ATRIAL FIBRILLATION: ICD-10-CM

## 2025-05-13 RX ORDER — B-COMPLEX WITH VITAMIN C
TABLET ORAL
COMMUNITY
Start: 2025-03-03

## 2025-06-13 DIAGNOSIS — J84.9 ILD (INTERSTITIAL LUNG DISEASE): ICD-10-CM

## 2025-06-13 DIAGNOSIS — J84.10 PULMONARY FIBROSIS: Primary | ICD-10-CM

## 2025-06-13 RX ORDER — NINTEDANIB 100 MG/1
1 CAPSULE ORAL 2 TIMES DAILY
Qty: 60 CAPSULE | Refills: 11 | OUTPATIENT
Start: 2025-06-13

## 2025-06-13 RX ORDER — NINTEDANIB 100 MG/1
100 CAPSULE ORAL 2 TIMES DAILY
Qty: 60 CAPSULE | Refills: 11 | Status: SHIPPED | OUTPATIENT
Start: 2025-06-13

## 2025-07-29 ENCOUNTER — LAB (OUTPATIENT)
Facility: HOSPITAL | Age: 70
End: 2025-07-29
Payer: MEDICARE

## 2025-07-29 DIAGNOSIS — K21.9 GASTROESOPHAGEAL REFLUX DISEASE, UNSPECIFIED WHETHER ESOPHAGITIS PRESENT: ICD-10-CM

## 2025-07-29 DIAGNOSIS — J84.9 ILD (INTERSTITIAL LUNG DISEASE): ICD-10-CM

## 2025-07-29 DIAGNOSIS — J30.2 SEASONAL ALLERGIES: ICD-10-CM

## 2025-07-29 DIAGNOSIS — I48.0 PAROXYSMAL ATRIAL FIBRILLATION: ICD-10-CM

## 2025-07-29 DIAGNOSIS — G47.33 OSA (OBSTRUCTIVE SLEEP APNEA): ICD-10-CM

## 2025-07-29 DIAGNOSIS — F17.201 TOBACCO ABUSE, IN REMISSION: ICD-10-CM

## 2025-07-29 LAB
ALBUMIN SERPL-MCNC: 3.7 G/DL (ref 3.5–5.2)
ALBUMIN/GLOB SERPL: 1.4 G/DL
ALP SERPL-CCNC: 95 U/L (ref 39–117)
ALT SERPL W P-5'-P-CCNC: 15 U/L (ref 1–33)
ANION GAP SERPL CALCULATED.3IONS-SCNC: 10 MMOL/L (ref 5–15)
AST SERPL-CCNC: 21 U/L (ref 1–32)
BASOPHILS # BLD AUTO: 0.07 10*3/MM3 (ref 0–0.2)
BASOPHILS NFR BLD AUTO: 0.7 % (ref 0–1.5)
BILIRUB SERPL-MCNC: 0.3 MG/DL (ref 0–1.2)
BUN SERPL-MCNC: 13 MG/DL (ref 8–23)
BUN/CREAT SERPL: 20.3 (ref 7–25)
CALCIUM SPEC-SCNC: 9.1 MG/DL (ref 8.6–10.5)
CHLORIDE SERPL-SCNC: 105 MMOL/L (ref 98–107)
CO2 SERPL-SCNC: 25 MMOL/L (ref 22–29)
CREAT SERPL-MCNC: 0.64 MG/DL (ref 0.57–1)
DEPRECATED RDW RBC AUTO: 40.3 FL (ref 37–54)
EGFRCR SERPLBLD CKD-EPI 2021: 95.2 ML/MIN/1.73
EOSINOPHIL # BLD AUTO: 0.15 10*3/MM3 (ref 0–0.4)
EOSINOPHIL NFR BLD AUTO: 1.4 % (ref 0.3–6.2)
ERYTHROCYTE [DISTWIDTH] IN BLOOD BY AUTOMATED COUNT: 12.1 % (ref 12.3–15.4)
GLOBULIN UR ELPH-MCNC: 2.7 GM/DL
GLUCOSE SERPL-MCNC: 108 MG/DL (ref 65–99)
HCT VFR BLD AUTO: 38.5 % (ref 34–46.6)
HGB BLD-MCNC: 12.8 G/DL (ref 12–15.9)
IMM GRANULOCYTES # BLD AUTO: 0.05 10*3/MM3 (ref 0–0.05)
IMM GRANULOCYTES NFR BLD AUTO: 0.5 % (ref 0–0.5)
LYMPHOCYTES # BLD AUTO: 3.66 10*3/MM3 (ref 0.7–3.1)
LYMPHOCYTES NFR BLD AUTO: 35.2 % (ref 19.6–45.3)
MCH RBC QN AUTO: 30.3 PG (ref 26.6–33)
MCHC RBC AUTO-ENTMCNC: 33.2 G/DL (ref 31.5–35.7)
MCV RBC AUTO: 91.2 FL (ref 79–97)
MONOCYTES # BLD AUTO: 0.99 10*3/MM3 (ref 0.1–0.9)
MONOCYTES NFR BLD AUTO: 9.5 % (ref 5–12)
NEUTROPHILS NFR BLD AUTO: 5.49 10*3/MM3 (ref 1.7–7)
NEUTROPHILS NFR BLD AUTO: 52.7 % (ref 42.7–76)
NRBC BLD AUTO-RTO: 0 /100 WBC (ref 0–0.2)
PLATELET # BLD AUTO: 339 10*3/MM3 (ref 140–450)
PMV BLD AUTO: 9.7 FL (ref 6–12)
POTASSIUM SERPL-SCNC: 4.2 MMOL/L (ref 3.5–5.2)
PROT SERPL-MCNC: 6.4 G/DL (ref 6–8.5)
RBC # BLD AUTO: 4.22 10*6/MM3 (ref 3.77–5.28)
SODIUM SERPL-SCNC: 140 MMOL/L (ref 136–145)
WBC NRBC COR # BLD AUTO: 10.41 10*3/MM3 (ref 3.4–10.8)

## 2025-07-29 PROCEDURE — 80053 COMPREHEN METABOLIC PANEL: CPT

## 2025-07-29 PROCEDURE — 85025 COMPLETE CBC W/AUTO DIFF WBC: CPT

## 2025-07-29 PROCEDURE — 36415 COLL VENOUS BLD VENIPUNCTURE: CPT

## (undated) DEVICE — SOLIDIFIER LIQLOC PLS 1500CC BT

## (undated) DEVICE — SOL IRRG H2O PL/BG 1000ML STRL

## (undated) DEVICE — CONN JET HYDRA H20 AUXILIARY DISP

## (undated) DEVICE — LINER SURG CANSTR SXN S/RIGD 1500CC

## (undated) DEVICE — BLCK/BITE BLOX WO/DENTL/RIM W/STRAP 54F

## (undated) DEVICE — GLV SURG BIOGEL LTX PF 7 1/2

## (undated) DEVICE — SOL IRR NACL 0.9PCT BT 1000ML

## (undated) DEVICE — Device: Brand: DEFENDO AIR/WATER/SUCTION AND BIOPSY VALVE

## (undated) DEVICE — Device

## (undated) DEVICE — SINGLE-USE BIOPSY FORCEPS: Brand: RADIAL JAW 4